# Patient Record
Sex: FEMALE | Race: WHITE | NOT HISPANIC OR LATINO | Employment: FULL TIME | URBAN - METROPOLITAN AREA
[De-identification: names, ages, dates, MRNs, and addresses within clinical notes are randomized per-mention and may not be internally consistent; named-entity substitution may affect disease eponyms.]

---

## 2020-12-31 ENCOUNTER — HOSPITAL ENCOUNTER (OUTPATIENT)
Dept: URGENT CARE | Facility: CLINIC | Age: 20
Discharge: HOME | End: 2020-12-31
Attending: NURSE PRACTITIONER

## 2021-01-08 LAB
C TRACH RRNA CVX QL NAA+PROBE: NOT DETECTED
N GONORRHOEA DNA SPEC QL NAA+PROBE: NOT DETECTED

## 2021-02-04 ENCOUNTER — HOSPITAL ENCOUNTER (OUTPATIENT)
Dept: URGENT CARE | Facility: CLINIC | Age: 21
Discharge: HOME | End: 2021-02-04
Attending: FAMILY MEDICINE

## 2023-10-03 ENCOUNTER — OFFICE VISIT (OUTPATIENT)
Dept: INTERNAL MEDICINE | Facility: CLINIC | Age: 23
End: 2023-10-03
Payer: COMMERCIAL

## 2023-10-03 VITALS
HEIGHT: 67 IN | BODY MASS INDEX: 25.77 KG/M2 | TEMPERATURE: 97.6 F | RESPIRATION RATE: 18 BRPM | WEIGHT: 164.2 LBS | SYSTOLIC BLOOD PRESSURE: 136 MMHG | DIASTOLIC BLOOD PRESSURE: 80 MMHG | OXYGEN SATURATION: 99 % | HEART RATE: 113 BPM

## 2023-10-03 DIAGNOSIS — Z13.220 SCREENING FOR LIPID DISORDERS: ICD-10-CM

## 2023-10-03 DIAGNOSIS — E53.8 VITAMIN B12 DEFICIENCY: ICD-10-CM

## 2023-10-03 DIAGNOSIS — F41.9 ANXIETY AND DEPRESSION: ICD-10-CM

## 2023-10-03 DIAGNOSIS — Z01.89 ROUTINE LAB DRAW: ICD-10-CM

## 2023-10-03 DIAGNOSIS — N94.6 DYSMENORRHEA: ICD-10-CM

## 2023-10-03 DIAGNOSIS — R53.83 OTHER FATIGUE: ICD-10-CM

## 2023-10-03 DIAGNOSIS — E55.9 VITAMIN D DEFICIENCY: ICD-10-CM

## 2023-10-03 DIAGNOSIS — D50.9 IRON DEFICIENCY ANEMIA, UNSPECIFIED IRON DEFICIENCY ANEMIA TYPE: ICD-10-CM

## 2023-10-03 DIAGNOSIS — G43.101 MIGRAINE WITH AURA AND WITH STATUS MIGRAINOSUS, NOT INTRACTABLE: ICD-10-CM

## 2023-10-03 DIAGNOSIS — F32.A ANXIETY AND DEPRESSION: ICD-10-CM

## 2023-10-03 DIAGNOSIS — Z76.89 ENCOUNTER TO ESTABLISH CARE: Primary | ICD-10-CM

## 2023-10-03 RX ORDER — RIMEGEPANT SULFATE 75 MG/75MG
75 TABLET, ORALLY DISINTEGRATING ORAL DAILY PRN
Qty: 4 TABLET | Refills: 0 | COMMUNITY
Start: 2023-10-03

## 2023-10-03 RX ORDER — MAGNESIUM OXIDE 400 MG/1
400 TABLET ORAL DAILY
COMMUNITY

## 2023-10-03 RX ORDER — BUSPIRONE HYDROCHLORIDE 5 MG/1
5 TABLET ORAL 3 TIMES DAILY PRN
Qty: 90 TABLET | Refills: 0 | Status: SHIPPED | OUTPATIENT
Start: 2023-10-03

## 2023-10-03 RX ORDER — FERROUS SULFATE TAB EC 324 MG (65 MG FE EQUIVALENT) 324 (65 FE) MG
324 TABLET DELAYED RESPONSE ORAL
COMMUNITY

## 2023-10-03 RX ORDER — ZINC SULFATE 50(220)MG
220 CAPSULE ORAL DAILY
COMMUNITY

## 2023-10-03 RX ORDER — LANOLIN ALCOHOL/MO/W.PET/CERES
1000 CREAM (GRAM) TOPICAL DAILY
COMMUNITY

## 2023-10-03 RX ORDER — ESCITALOPRAM OXALATE 20 MG/1
20 TABLET ORAL DAILY
COMMUNITY

## 2023-10-03 NOTE — PROGRESS NOTES
Chief Complaint  Establish Care (23 year old female here today to establish care. She complains of fatigue, weight gain, painful periods and headaches. States she would to discuss PCOS)    History of Present Illness  SUBJECTIVE  Marisol Moses presents to [facility] to establish care.    Previous PCP-Deanna Shine.     Tobacco-denies use.  Alcohol-occasionally.  PAP smear-never. Counseling provided    Patient states that she feels like she may have had some vitamin deficiencies so she started taking vitamins.     Patient states that she has gained weight even after exercising and eating healthier. She states that she is fatigued and has painful periods.  She states has a menstrual cycle every 28-30 days, 3-5 days. She states she is having to change out the heaviest level tampon every 2 hours on her heaviest days.   Plans to try to conceive in the next couple of years.    Headaches-worse over the last 2 weeks. She states that her vision changes and then within 30 minutes she is down. She denies a history of migraines. She states that she was getting migraines once every couple of months and lately, they have been occurring more frequently, sometimes lasting a few days.    She denies any chest pain, soa, palpitations, nausea, vomiting, diarrhea, changes to bowel/bladder habits.    Past Medical History:   Diagnosis Date    Anxiety     Depression     Headache       Family History   Problem Relation Age of Onset    Depression Mother     Anxiety disorder Mother     Hypothyroidism Mother     Hypertension Father     Atrial fibrillation Father     Paternal grandmother-breast cancer (diagnosed around age 63)        History reviewed. No pertinent surgical history.     Current Outpatient Medications:     escitalopram (LEXAPRO) 20 MG tablet, Take 1 tablet by mouth Daily., Disp: , Rfl:     ferrous sulfate 324 (65 Fe) MG tablet delayed-release EC tablet, Take 1 tablet by mouth  "Daily With Breakfast., Disp: , Rfl:     magnesium oxide (MAG-OX) 400 MG tablet, Take 1 tablet by mouth Daily., Disp: , Rfl:     Omega-3 Fatty Acids (OMEGA 3 500 PO), Take  by mouth., Disp: , Rfl:     vitamin B-12 (CYANOCOBALAMIN) 1000 MCG tablet, Take 1 tablet by mouth Daily., Disp: , Rfl:     zinc sulfate (ZINCATE) 220 (50 Zn) MG capsule, Take 1 capsule by mouth Daily., Disp: , Rfl:     busPIRone (BUSPAR) 5 MG tablet, Take 1 tablet by mouth 3 (Three) Times a Day As Needed (anxiety)., Disp: 90 tablet, Rfl: 0    Rimegepant Sulfate (Nurtec) 75 MG tablet dispersible tablet, Take 1 tablet by mouth Daily As Needed (migraines)., Disp: 4 tablet, Rfl: 0    OBJECTIVE  Vital Signs:   /80 (BP Location: Left arm, Patient Position: Sitting)   Pulse 113   Temp 97.6 °F (36.4 °C) (Temporal)   Resp 18   Ht 170.2 cm (67\")   Wt 74.5 kg (164 lb 3.2 oz)   SpO2 99%   BMI 25.72 kg/m²    Estimated body mass index is 25.72 kg/m² as calculated from the following:    Height as of this encounter: 170.2 cm (67\").    Weight as of this encounter: 74.5 kg (164 lb 3.2 oz).     Wt Readings from Last 3 Encounters:   10/03/23 74.5 kg (164 lb 3.2 oz)     BP Readings from Last 3 Encounters:   10/03/23 136/80       Physical Exam  Vitals and nursing note reviewed.   Constitutional:       Appearance: Normal appearance.   HENT:      Head: Normocephalic.      Right Ear: Tympanic membrane normal.      Left Ear: Tympanic membrane normal.   Eyes:      Extraocular Movements: Extraocular movements intact.      Conjunctiva/sclera: Conjunctivae normal.   Cardiovascular:      Rate and Rhythm: Normal rate and regular rhythm.      Heart sounds: Normal heart sounds. No murmur heard.  Pulmonary:      Effort: Pulmonary effort is normal.      Breath sounds: Normal breath sounds. No wheezing or rales.   Abdominal:      General: Bowel sounds are normal.      Palpations: Abdomen is soft.      Tenderness: There is no abdominal tenderness. There is no guarding. "   Musculoskeletal:         General: No swelling. Normal range of motion.      Cervical back: Normal range of motion and neck supple.   Skin:     General: Skin is warm and dry.   Neurological:      General: No focal deficit present.      Mental Status: She is alert and oriented to person, place, and time. Mental status is at baseline.   Psychiatric:         Mood and Affect: Mood normal.         Behavior: Behavior normal.         Thought Content: Thought content normal.         Judgment: Judgment normal.        Result Review        No Images in the past 120 days found..     The above data has been reviewed by MICHAEL Barr 10/03/2023 13:56 EDT.          Patient Care Team:  Adelina Avery APRN as PCP - General (Internal Medicine)    BMI is >= 25 and <30. (Overweight) The following options were offered after discussion;: exercise counseling/recommendations and nutrition counseling/recommendations       ASSESSMENT & PLAN    Diagnoses and all orders for this visit:    1. Encounter to establish care (Primary)    2. Migraine with aura and with status migrainosus, not intractable  Assessment & Plan:  Complains of worsening migraines.  She states she used to get a migraine once every month or two but over the last few weeks she has had several.  She states that sometimes they last a few days.  Will do a trial of Nurtec.  Samples provided in the office.    Orders:  -     MRI Brain Without Contrast; Future  -     Rimegepant Sulfate (Nurtec) 75 MG tablet dispersible tablet; Take 1 tablet by mouth Daily As Needed (migraines).  Dispense: 4 tablet; Refill: 0    3. Anxiety and depression  Assessment & Plan:  Patient states that her anxiety and depression is stable.  She states she is an anxious person.  She states that Lexapro works better than the others she has been on.  Denies any SI/HI.  We discussed PGX testing.  We will go ahead and do that.  Start Buspar PRN as well.   Risks/benefits discussed with patient.     Orders:  -      busPIRone (BUSPAR) 5 MG tablet; Take 1 tablet by mouth 3 (Three) Times a Day As Needed (anxiety).  Dispense: 90 tablet; Refill: 0    4. Dysmenorrhea  -     Insulin, Random; Future  -     Ambulatory Referral to Obstetrics / Gynecology    5. Routine lab draw  -     CBC w AUTO Differential; Future  -     Comprehensive metabolic panel; Future  -     TSH+Free T4; Future    6. Screening for lipid disorders  -     Lipid panel; Future    7. Vitamin B12 deficiency  -     Vitamin B12 & Folate; Future    8. Vitamin D deficiency  -     Vitamin D,25-Hydroxy; Future    9. Iron deficiency anemia, unspecified iron deficiency anemia type  -     Iron Profile; Future  -     Ferritin; Future    10. Other fatigue  -     Magnesium; Future  -     PTH, Intact; Future         Tobacco Use: Low Risk     Smoking Tobacco Use: Never    Smokeless Tobacco Use: Never    Passive Exposure: Not on file       Follow Up     Return in about 4 weeks (around 10/31/2023) for Annual physical.    Please note that portions of this note were completed with a voice recognition program.    Patient was given instructions and counseling regarding her condition or for health maintenance advice. Please see specific information pulled into the AVS if appropriate.   I have reviewed information obtained and documented by others and I have confirmed the accuracy of this documented note.    MICHAEL Barr

## 2023-10-03 NOTE — ASSESSMENT & PLAN NOTE
Complains of worsening migraines.  She states she used to get a migraine once every month or two but over the last few weeks she has had several.  She states that sometimes they last a few days.  Will do a trial of Nurtec.  Samples provided in the office.

## 2023-10-03 NOTE — ASSESSMENT & PLAN NOTE
Patient states that her anxiety and depression is stable.  She states she is an anxious person.  She states that Lexapro works better than the others she has been on.  Denies any SI/HI.  We discussed PGX testing.  We will go ahead and do that.  Start Buspar PRN as well.   Risks/benefits discussed with patient.

## 2023-10-26 ENCOUNTER — TELEPHONE (OUTPATIENT)
Dept: INTERNAL MEDICINE | Facility: CLINIC | Age: 23
End: 2023-10-26
Payer: COMMERCIAL

## 2023-11-02 ENCOUNTER — INITIAL PRENATAL (OUTPATIENT)
Dept: OBSTETRICS AND GYNECOLOGY | Facility: CLINIC | Age: 23
End: 2023-11-02
Payer: COMMERCIAL

## 2023-11-02 ENCOUNTER — TELEPHONE (OUTPATIENT)
Dept: OBSTETRICS AND GYNECOLOGY | Facility: CLINIC | Age: 23
End: 2023-11-02

## 2023-11-02 VITALS — BODY MASS INDEX: 25.37 KG/M2 | WEIGHT: 162 LBS | DIASTOLIC BLOOD PRESSURE: 77 MMHG | SYSTOLIC BLOOD PRESSURE: 116 MMHG

## 2023-11-02 DIAGNOSIS — Z12.4 SCREENING FOR CERVICAL CANCER: ICD-10-CM

## 2023-11-02 DIAGNOSIS — O21.9 NAUSEA AND VOMITING IN PREGNANCY: ICD-10-CM

## 2023-11-02 DIAGNOSIS — Z34.01 ENCOUNTER FOR SUPERVISION OF NORMAL FIRST PREGNANCY IN FIRST TRIMESTER: Primary | ICD-10-CM

## 2023-11-02 DIAGNOSIS — O99.341 DEPRESSION DURING PREGNANCY IN FIRST TRIMESTER: ICD-10-CM

## 2023-11-02 DIAGNOSIS — F32.A DEPRESSION DURING PREGNANCY IN FIRST TRIMESTER: ICD-10-CM

## 2023-11-02 DIAGNOSIS — O26.851 SPOTTING DURING PREGNANCY IN FIRST TRIMESTER: ICD-10-CM

## 2023-11-02 DIAGNOSIS — Z11.3 SCREEN FOR STD (SEXUALLY TRANSMITTED DISEASE): ICD-10-CM

## 2023-11-02 LAB
B-HCG UR QL: POSITIVE
EXPIRATION DATE: ABNORMAL
GLUCOSE UR STRIP-MCNC: NEGATIVE MG/DL
INTERNAL NEGATIVE CONTROL: NEGATIVE
INTERNAL POSITIVE CONTROL: POSITIVE
Lab: ABNORMAL
PROT UR STRIP-MCNC: NEGATIVE MG/DL

## 2023-11-02 RX ORDER — ONDANSETRON 4 MG/1
4 TABLET, ORALLY DISINTEGRATING ORAL EVERY 8 HOURS PRN
Qty: 30 TABLET | Refills: 2 | Status: SHIPPED | OUTPATIENT
Start: 2023-11-02

## 2023-11-02 NOTE — PROGRESS NOTES
Initial ob visit     CC- Here for care of pregnancy     Marisol Moses is being seen today for her first obstetrical visit.  She is a 23 y.o.    6w4d gestation.     # 1 - Date: None, Sex: None, Weight: None, GA: None, Delivery: None, Apgar1: None, Apgar5: None, Living: None, Birth Comments: None      Current obstetric complaints : light bleeding today when wipinge  Duration/severity of complaints:   Initial positive test date : 10/15/2023     Location : @ home    Prior obstetric issues, potential pregnancy concerns: Migraines  Family history of genetic issues (includes FOB): none  Prior infections concerning in pregnancy (Rash, fever in last 2 weeks): none  Varicella Hx -immunity  Prior testing for Cystic Fibrosis Carrier or Sickle Cell Trait- unknown status  Prepregnancy BMI - Body mass index is 25.37 kg/m².  Hx of HSV for patient or partner : No      Past Medical History:   Diagnosis Date    Anxiety     Depression     Headache     Migraine        History reviewed. No pertinent surgical history.      Current Outpatient Medications:     ondansetron ODT (ZOFRAN-ODT) 4 MG disintegrating tablet, Place 1 tablet on the tongue Every 8 (Eight) Hours As Needed for Nausea or Vomiting., Disp: 30 tablet, Rfl: 2    Allergies   Allergen Reactions    Penicillins Rash       Social History     Socioeconomic History    Marital status: Single   Tobacco Use    Smoking status: Never    Smokeless tobacco: Never   Vaping Use    Vaping Use: Never used   Substance and Sexual Activity    Alcohol use: Not Currently     Comment: socially    Drug use: Never    Sexual activity: Yes     Partners: Male     Birth control/protection: None       Family History   Problem Relation Age of Onset    Hypertension Father     Atrial fibrillation Father     Depression Mother     Anxiety disorder Mother     Hypothyroidism Mother     Breast cancer Paternal Grandmother     Ovarian cancer Neg Hx     Uterine cancer Neg Hx     Colon cancer Neg Hx      Deep vein thrombosis Neg Hx     Pulmonary embolism Neg Hx        Review of systems     Review of Systems   Constitutional:  Negative for fever.   Eyes:  Negative for visual disturbance.   Gastrointestinal:  Positive for nausea. Negative for abdominal pain and vomiting.   Genitourinary:  Positive for breast pain and vaginal bleeding. Negative for pelvic pain and vaginal discharge.   Neurological:  Negative for headache.   All other systems reviewed and are negative.          Objective    /77   Wt 73.5 kg (162 lb)   LMP 09/17/2023 (Approximate)   BMI 25.37 kg/m²     Prenatal Assessment  Fundal Height (cm): 6 cm  Movement: Absent  Dilation/Effacement/Station  Dilation: Closed  Effacement (%): 0  Station: -2  Edema  LLE Edema: None  RLE Edema: None  Facial Edema: None    Physical Exam  Constitutional:       General: She is not in acute distress.     Appearance: Normal appearance. She is normal weight.   Genitourinary:      Right Labia: No lesions or Bartholin's cyst.     Left Labia: No lesions or Bartholin's cyst.     No inguinal adenopathy present in the right or left side.     No vaginal discharge or bleeding.        Right Adnexa: not tender, not full and no mass present.     Left Adnexa: not tender, not full and no mass present.     No cervical motion tenderness or lesion.      Uterus is enlarged.      Uterus is not tender.      No uterine mass detected.     Uterus is retroverted.   Breasts:     Right: No inverted nipple, mass or nipple discharge.      Left: No inverted nipple, mass or nipple discharge.   HENT:      Head: Normocephalic and atraumatic.      Mouth/Throat:      Mouth: Mucous membranes are moist.   Eyes:      Conjunctiva/sclera: Conjunctivae normal.   Cardiovascular:      Rate and Rhythm: Normal rate and regular rhythm.      Pulses: Normal pulses.      Heart sounds: No murmur heard.  Pulmonary:      Effort: Pulmonary effort is normal. No respiratory distress.      Breath sounds: Normal breath  sounds.   Abdominal:      General: There is no distension.      Palpations: Abdomen is soft.      Tenderness: There is no abdominal tenderness.   Musculoskeletal:         General: No tenderness.      Cervical back: Normal range of motion and neck supple. No tenderness.      Right lower leg: No edema.      Left lower leg: No edema.   Lymphadenopathy:      Cervical: No cervical adenopathy.      Lower Body: No right inguinal adenopathy. No left inguinal adenopathy.   Neurological:      General: No focal deficit present.      Mental Status: She is alert.   Skin:     General: Skin is warm and dry.      Capillary Refill: Capillary refill takes less than 2 seconds.   Psychiatric:         Thought Content: Thought content normal.   Vitals reviewed. Exam conducted with a chaperone present.          Brief Urine Lab Results  (Last result in the past 365 days)        Color   Clarity   Blood   Leuk Est   Nitrite   Protein   CREAT   Urine HCG        11/02/23 1306               Positive       11/02/23 1306           Negative                    Assessment & Plan     Diagnoses and all orders for this visit:    1. Encounter for supervision of normal first pregnancy in first trimester (Primary)  -     POC Urinalysis Dipstick  -     POC Pregnancy, Urine  -     OB Panel With HIV  -     Urine Culture - , Urine, Clean Catch    2. Spotting during pregnancy in first trimester  -     US Ob Transvaginal    3. Depression during pregnancy in first trimester    4. Nausea and vomiting in pregnancy    5. Screen for STD (sexually transmitted disease)  -     Chlamydia trachomatis, Neisseria gonorrhoeae, Trichomonas vaginalis, PCR - Swab, Vagina    6. Screening for cervical cancer  -     IGP, Rfx Aptima HPV ASCU    Other orders  -     ondansetron ODT (ZOFRAN-ODT) 4 MG disintegrating tablet; Place 1 tablet on the tongue Every 8 (Eight) Hours As Needed for Nausea or Vomiting.  Dispense: 30 tablet; Refill: 2        1) Pregnancy at 6w4d  Initial  pregnancy, initial visit.   Discussed screening varicella status and carrier status for CF, SMA   Declined as she is nervous about blood draws to begin with and requests to limit to what is required/necessary (can revisit down the road)   2) Spotting   Started today   Exam with no blood in vault and not active bleeding from cervix, cervix closed  Checking ultrasound to confirm location/viability   3) Depression/anxiety in pregnancy   Was on Lexapro and Buspar  Discussed use of medication in pregnancy   Currently wishes to try without medication   4) Nausea  Asked for non-sedating   Zofran sent.            Activity recommendation : 150 minutes/week of moderate intensity aerobic activity unless we limit for bleeding, hypertension or other pregnancy complication   Patient is on Prenatal vitamins  Problem list reviewed and updated.  Reviewed routine prenatal care with the office to include but not limited to   General pregnancy recommendations reviewed including but not limited to not changing cat litter, food restrictions, avoidance of alcohol, tobacco and drugs and saunas/hot tubs.   All questions answered.     Malvin Hartley MD   11/2/2023  13:49 EDT

## 2023-11-02 NOTE — TELEPHONE ENCOUNTER
Avis,     Ultrasound was reassuring   Leonardo pregnancy with heart beat in the uterus.      She was measuring 7 weeks and 1 day, which is close enough to her cycle of 6 weeks and 4 days to keep our EDC we discussed.     Due date 6/23/24.     Please make sure they are aware.     Thanks,   Dr. Hartley

## 2023-11-03 LAB
ABO GROUP BLD: NORMAL
BASOPHILS # BLD AUTO: 0 X10E3/UL (ref 0–0.2)
BASOPHILS NFR BLD AUTO: 0 %
BLD GP AB SCN SERPL QL: NEGATIVE
EOSINOPHIL # BLD AUTO: 0.1 X10E3/UL (ref 0–0.4)
EOSINOPHIL NFR BLD AUTO: 1 %
ERYTHROCYTE [DISTWIDTH] IN BLOOD BY AUTOMATED COUNT: 13 % (ref 11.7–15.4)
HBV SURFACE AG SERPL QL IA: NEGATIVE
HCT VFR BLD AUTO: 37.2 % (ref 34–46.6)
HCV IGG SERPL QL IA: NON REACTIVE
HGB BLD-MCNC: 12.5 G/DL (ref 11.1–15.9)
HIV 1+2 AB+HIV1 P24 AG SERPL QL IA: NON REACTIVE
IMM GRANULOCYTES # BLD AUTO: 0 X10E3/UL (ref 0–0.1)
IMM GRANULOCYTES NFR BLD AUTO: 0 %
LYMPHOCYTES # BLD AUTO: 2 X10E3/UL (ref 0.7–3.1)
LYMPHOCYTES NFR BLD AUTO: 22 %
MCH RBC QN AUTO: 30 PG (ref 26.6–33)
MCHC RBC AUTO-ENTMCNC: 33.6 G/DL (ref 31.5–35.7)
MCV RBC AUTO: 89 FL (ref 79–97)
MONOCYTES # BLD AUTO: 0.8 X10E3/UL (ref 0.1–0.9)
MONOCYTES NFR BLD AUTO: 8 %
NEUTROPHILS # BLD AUTO: 6.4 X10E3/UL (ref 1.4–7)
NEUTROPHILS NFR BLD AUTO: 69 %
PLATELET # BLD AUTO: 349 X10E3/UL (ref 150–450)
RBC # BLD AUTO: 4.17 X10E6/UL (ref 3.77–5.28)
RH BLD: NEGATIVE
RPR SER QL: NON REACTIVE
RUBV IGG SERPL IA-ACNC: 1.76 INDEX
WBC # BLD AUTO: 9.4 X10E3/UL (ref 3.4–10.8)

## 2023-11-03 NOTE — PROGRESS NOTES
Avis, Her prenatal panel is normal. She is A negative for her blood type, should do Rhogam for the spotting/bleeding she was doing yesterday. So will review these initial tests when seen for her next visit in person. Thanks, Dr. Hartley

## 2023-11-04 LAB
BACTERIA UR CULT: NORMAL
BACTERIA UR CULT: NORMAL

## 2023-11-06 ENCOUNTER — CLINICAL SUPPORT (OUTPATIENT)
Dept: OBSTETRICS AND GYNECOLOGY | Facility: CLINIC | Age: 23
End: 2023-11-06
Payer: COMMERCIAL

## 2023-11-07 LAB
C TRACH RRNA SPEC QL NAA+PROBE: NEGATIVE
CONV .: NORMAL
CYTOLOGIST CVX/VAG CYTO: NORMAL
CYTOLOGY CVX/VAG DOC CYTO: NORMAL
DX ICD CODE: NORMAL
HIV 1 & 2 AB SER-IMP: NORMAL
N GONORRHOEA RRNA SPEC QL NAA+PROBE: NEGATIVE
OTHER STN SPEC: NORMAL
STAT OF ADQ CVX/VAG CYTO-IMP: NORMAL
T VAGINALIS RRNA SPEC QL NAA+PROBE: NEGATIVE

## 2023-11-08 ENCOUNTER — TELEPHONE (OUTPATIENT)
Dept: OBSTETRICS AND GYNECOLOGY | Facility: CLINIC | Age: 23
End: 2023-11-08
Payer: COMMERCIAL

## 2023-11-08 NOTE — TELEPHONE ENCOUNTER
----- Message from Avis Cerda MA sent at 11/3/2023  3:18 PM EDT -----  L/m for pt/fortunato  ----- Message -----  From: Malvin Hartley MD  Sent: 11/3/2023   1:41 PM EDT  To: WELLINGTON Hilton,     Up to her.   Generally recommended (could do closer to home - Hospital or possibly even pharmacy if this will work better)    Thanks,   Dr. Hartley    ----- Message -----  From: Avis Cerda MA  Sent: 11/3/2023  12:02 PM EDT  To: MD Dr. Odilia Marley,    She is aware. She is asking if Rhogam is necessary as she did not have much bleeding. It will be difficult for her to come to the office for the injection due to work and lives further away.     Thanks,  Avis   ----- Message -----  From: Malvin Hartley MD  Sent: 11/3/2023   9:12 AM EDT  To: WELLINGTON Hilton, Her prenatal panel is normal. She is A negative for her blood type, should do Rhogam for the spotting/bleeding she was doing yesterday. So will review these initial tests when seen for her next visit in person. Thanks, Dr. Hartley

## 2023-11-08 NOTE — TELEPHONE ENCOUNTER
----- Message from Malvin Hartley MD sent at 11/6/2023  8:59 AM EST -----  Avis, urine culture did not show UTI. Please let her know. Thanks Dr. Hartley

## 2023-11-14 ENCOUNTER — TELEPHONE (OUTPATIENT)
Dept: OBSTETRICS AND GYNECOLOGY | Facility: CLINIC | Age: 23
End: 2023-11-14
Payer: COMMERCIAL

## 2023-11-14 NOTE — TELEPHONE ENCOUNTER
Caller: Marisol Moses    Relationship: Self    Best call back number: [phone]    Requested Prescriptions: ZOFRAN  Requested Prescriptions      No prescriptions requested or ordered in this encounter        Pharmacy where request should be sent:      Last office visit with prescribing clinician: Visit date not found   Last telemedicine visit with prescribing clinician: Visit date not found   Next office visit with prescribing clinician: 11/27/2023     Additional details provided by patient: [facility] [place]    Does the patient have less than a 3 day supply:  [x] Yes  [] No    Would you like a call back once the refill request has been completed: [x] Yes [] No    If the office needs to give you a call back, can they leave a voicemail: [x] Yes [] No    Chantelle Douglas Rep   11/14/23 16:38 EST     DR. TAPIA PRESCRIBED THIS MEDICATION TO THE PT TO TAKE ONCE EVERY 8 HRS FOR NAUSEA - QT OF 30 W/2 REFILLS - PT IS TAKING THIS TWICE A DAY AND WILL RUN OUT OF THE ORIGINAL 30 TOMORROW - PT WILL ASK THE PHARMACY TO REFILL BUT THEY MAY NOT BASED ON THE QUANTITY - DOES THIS NEED TO BE RESENT WITH A DIFFERENT QUANTITY SO PT CAN GET REFILL

## 2023-11-27 ENCOUNTER — ROUTINE PRENATAL (OUTPATIENT)
Dept: OBSTETRICS AND GYNECOLOGY | Facility: CLINIC | Age: 23
End: 2023-11-27
Payer: COMMERCIAL

## 2023-11-27 VITALS — BODY MASS INDEX: 25.22 KG/M2 | WEIGHT: 161 LBS | DIASTOLIC BLOOD PRESSURE: 74 MMHG | SYSTOLIC BLOOD PRESSURE: 130 MMHG

## 2023-11-27 DIAGNOSIS — F32.A DEPRESSION DURING PREGNANCY IN FIRST TRIMESTER: ICD-10-CM

## 2023-11-27 DIAGNOSIS — O99.341 DEPRESSION DURING PREGNANCY IN FIRST TRIMESTER: ICD-10-CM

## 2023-11-27 DIAGNOSIS — O99.611 CONSTIPATION IN PREGNANCY IN FIRST TRIMESTER: ICD-10-CM

## 2023-11-27 DIAGNOSIS — K59.00 CONSTIPATION IN PREGNANCY IN FIRST TRIMESTER: ICD-10-CM

## 2023-11-27 DIAGNOSIS — Z34.01 ENCOUNTER FOR SUPERVISION OF NORMAL FIRST PREGNANCY IN FIRST TRIMESTER: Primary | ICD-10-CM

## 2023-11-27 LAB
GLUCOSE UR STRIP-MCNC: NEGATIVE MG/DL
PROT UR STRIP-MCNC: NEGATIVE MG/DL

## 2023-11-27 PROCEDURE — 0502F SUBSEQUENT PRENATAL CARE: CPT | Performed by: OBSTETRICS & GYNECOLOGY

## 2023-11-27 NOTE — PROGRESS NOTES
OB follow up     Marisol Moses is a 23 y.o.  10w1d being seen today for her obstetrical visit.  Patient reports  constipation . Fetal movement:  absent .    Her prenatal care is complicated by (and status): depression/anxiety    Review of Systems  Cramping/contractions : none   Vaginal bleeding: none   Fetal movement too early     /74   Wt 73 kg (161 lb)   LMP 2023 (Approximate)   BMI 25.22 kg/m²     Prenatal Assessment  Fetal Heart Rate: 166  Fundal Height (cm): 10 cm  Movement: Absent  Edema  LLE Edema: None  RLE Edema: None  Facial Edema: None        Assessment    Diagnoses and all orders for this visit:    1. Encounter for supervision of normal first pregnancy in first trimester (Primary)  -     POC Urinalysis Dipstick    2. Depression during pregnancy in first trimester    3. Constipation in pregnancy in first trimester        1) pregnancy at 10w1d   Prenatal panel normal   STD and urine culture negative   Pap normal   NIPT discussed and declined   2) Anxiety/depression   Stopped Buspar and lexapro   Doing okay for now  3) Constipation   Encouraged hydration/mirilax  Considering from there.       Plan    Reviewed this stage of pregnancy  Problem list updated   Follow up in 4-5 weeks    Malvin Hartley MD   2023  13:31 EST

## 2023-12-20 ENCOUNTER — ROUTINE PRENATAL (OUTPATIENT)
Dept: OBSTETRICS AND GYNECOLOGY | Facility: CLINIC | Age: 23
End: 2023-12-20
Payer: COMMERCIAL

## 2023-12-20 VITALS — SYSTOLIC BLOOD PRESSURE: 127 MMHG | BODY MASS INDEX: 25.53 KG/M2 | WEIGHT: 163 LBS | DIASTOLIC BLOOD PRESSURE: 68 MMHG

## 2023-12-20 DIAGNOSIS — F32.A DEPRESSION DURING PREGNANCY IN FIRST TRIMESTER: ICD-10-CM

## 2023-12-20 DIAGNOSIS — Z34.01 ENCOUNTER FOR SUPERVISION OF NORMAL FIRST PREGNANCY IN FIRST TRIMESTER: Primary | ICD-10-CM

## 2023-12-20 DIAGNOSIS — O99.341 DEPRESSION DURING PREGNANCY IN FIRST TRIMESTER: ICD-10-CM

## 2023-12-20 LAB
GLUCOSE UR STRIP-MCNC: NEGATIVE MG/DL
PROT UR STRIP-MCNC: NEGATIVE MG/DL

## 2023-12-20 NOTE — PROGRESS NOTES
OB follow up     Marisol Moses is a 23 y.o.  13w3d being seen today for her obstetrical visit.  Patient reports nausea. Fetal movement:  absent .    Her prenatal care is complicated by (and status): anxiety/depression     Review of Systems  Cramping/contractions : none   Vaginal bleeding: none   Fetal movement too early     /68   Wt 73.9 kg (163 lb)   LMP 2023 (Approximate)   BMI 25.53 kg/m²     Prenatal Assessment  Fetal Heart Rate: 163  Fundal Height (cm): 13 cm  Movement: Absent  Edema  LLE Edema: None  RLE Edema: None  Facial Edema: None        Assessment    Diagnoses and all orders for this visit:    1. Encounter for supervision of normal first pregnancy in first trimester (Primary)  -     POC Urinalysis Dipstick    2. Depression during pregnancy in first trimester        1) pregnancy at 13w3d   Quickening reviewed   Discussed NIPT, screening was declined  Flu shot recommended   Anatomy scan for next visit at 20-21 wks   Transitional to second trimester   2) Anxiety/depression   Doing well off medication     Plan    Reviewed this stage of pregnancy  Problem list updated   Follow up in 4 weeks    Malvin Hartley MD   2023  14:26 EST

## 2024-01-23 ENCOUNTER — ROUTINE PRENATAL (OUTPATIENT)
Dept: OBSTETRICS AND GYNECOLOGY | Facility: CLINIC | Age: 24
End: 2024-01-23
Payer: COMMERCIAL

## 2024-01-23 VITALS — BODY MASS INDEX: 25.84 KG/M2 | DIASTOLIC BLOOD PRESSURE: 73 MMHG | WEIGHT: 165 LBS | SYSTOLIC BLOOD PRESSURE: 113 MMHG

## 2024-01-23 DIAGNOSIS — Z36.89 ENCOUNTER FOR FETAL ANATOMIC SURVEY: ICD-10-CM

## 2024-01-23 DIAGNOSIS — Z34.02 ENCOUNTER FOR SUPERVISION OF NORMAL FIRST PREGNANCY IN SECOND TRIMESTER: Primary | ICD-10-CM

## 2024-01-23 LAB
GLUCOSE UR STRIP-MCNC: NEGATIVE MG/DL
PROT UR STRIP-MCNC: NEGATIVE MG/DL

## 2024-01-23 PROCEDURE — 0502F SUBSEQUENT PRENATAL CARE: CPT | Performed by: OBSTETRICS & GYNECOLOGY

## 2024-01-23 NOTE — PROGRESS NOTES
OB follow up     Marisol Moses is a 23 y.o.  18w2d being seen today for her obstetrical visit.  Patient reports  headaches and dizziness . Fetal movement:  present .    Her prenatal care is complicated by (and status): anxiety/depression     Review of Systems  Cramping/contractions : none   Vaginal bleeding: none   Fetal movement present     /73   Wt 74.8 kg (165 lb)   LMP 2023 (Approximate)   BMI 25.84 kg/m²     Prenatal Assessment  Fetal Heart Rate: 154  Fundal Height (cm): 18 cm  Movement: Present  Edema  LLE Edema: None  RLE Edema: None  Facial Edema: None        Assessment    Diagnoses and all orders for this visit:    1. Encounter for supervision of normal first pregnancy in second trimester (Primary)  -     POC Urinalysis Dipstick    2. Encounter for fetal anatomic survey  -     US Ob 14 + Weeks Single or First Gestation; Future        1) pregnancy at 18w2d   Reviewed headaches (to try Mg++ supplement), dizziness (anti-histamine) and heartburn (using TUMS)   Check anatomy with next visit.    -anxiety/depression doing fine off medication     Plan    Reviewed this stage of pregnancy  Problem list updated   Follow up in 2 weeks with anatomy scan     Malvin Hartley MD   2024  15:23 EST

## 2024-02-06 ENCOUNTER — ROUTINE PRENATAL (OUTPATIENT)
Dept: OBSTETRICS AND GYNECOLOGY | Facility: CLINIC | Age: 24
End: 2024-02-06
Payer: COMMERCIAL

## 2024-02-06 VITALS — DIASTOLIC BLOOD PRESSURE: 60 MMHG | BODY MASS INDEX: 26.63 KG/M2 | SYSTOLIC BLOOD PRESSURE: 117 MMHG | WEIGHT: 170 LBS

## 2024-02-06 DIAGNOSIS — Z34.02 ENCOUNTER FOR SUPERVISION OF NORMAL FIRST PREGNANCY IN SECOND TRIMESTER: Primary | ICD-10-CM

## 2024-02-06 DIAGNOSIS — O99.341 DEPRESSION DURING PREGNANCY IN FIRST TRIMESTER: ICD-10-CM

## 2024-02-06 DIAGNOSIS — F32.A DEPRESSION DURING PREGNANCY IN FIRST TRIMESTER: ICD-10-CM

## 2024-02-06 DIAGNOSIS — Z36.2 ENCOUNTER FOR OTHER ANTENATAL SCREENING FOLLOW-UP: ICD-10-CM

## 2024-02-06 DIAGNOSIS — O26.892 PREGNANCY HEADACHE IN SECOND TRIMESTER: ICD-10-CM

## 2024-02-06 DIAGNOSIS — R51.9 PREGNANCY HEADACHE IN SECOND TRIMESTER: ICD-10-CM

## 2024-02-06 LAB
GLUCOSE UR STRIP-MCNC: NEGATIVE MG/DL
PROT UR STRIP-MCNC: ABNORMAL MG/DL

## 2024-02-06 RX ORDER — MAGNESIUM OXIDE 400 MG/1
400 TABLET ORAL DAILY
Qty: 30 TABLET | Refills: 3 | Status: SHIPPED | OUTPATIENT
Start: 2024-02-06

## 2024-02-06 RX ORDER — PROMETHAZINE HYDROCHLORIDE 25 MG/1
25 TABLET ORAL EVERY 6 HOURS PRN
Qty: 30 TABLET | Refills: 2 | Status: SHIPPED | OUTPATIENT
Start: 2024-02-06

## 2024-02-06 NOTE — PROGRESS NOTES
OB follow up     Marisol Moses is a 23 y.o.  20w2d being seen today for her obstetrical visit.  Patient reports no complaints. Fetal movement: normal.    Her prenatal care is complicated by (and status): anxiety/depression, migraines and constipation     Review of Systems  Cramping/contractions : none   Vaginal bleeding: none   Fetal movement good     /60   Wt 77.1 kg (170 lb)   LMP 2023 (Approximate)   BMI 26.63 kg/m²     Prenatal Assessment  Fetal Heart Rate: 135  Fundal Height (cm): 20 cm  Movement: Present  Presentation: Transverse  Edema  LLE Edema: None  RLE Edema: None  Facial Edema: None        Assessment    Diagnoses and all orders for this visit:    1. Encounter for supervision of normal first pregnancy in second trimester (Primary)  -     POC Urinalysis Dipstick    2. Depression during pregnancy in first trimester    3. Encounter for other  screening follow-up  -     US Ob 14 + Weeks Single or First Gestation; Future    4. Pregnancy headache in second trimester    Other orders  -     magnesium oxide (MAG-OX) 400 MG tablet; Take 1 tablet by mouth Daily.  Dispense: 30 tablet; Refill: 3  -     promethazine (PHENERGAN) 25 MG tablet; Take 1 tablet by mouth Every 6 (Six) Hours As Needed for Nausea or Vomiting.  Dispense: 30 tablet; Refill: 2        1) pregnancy at 20w2d   Anatomic survey good except need to repeat heart - sub optimal  Cervical length reassuring   2) Depression   Stable off meds  3) Headache - migraine   Trial of magnesium oxide and phenergan   Discussed trial   Return if not controlling   4) constipation   Tried miralax in the past without help   Okay to trial mag citrate   Umbilical pain also reviewed       Plan    Reviewed this stage of pregnancy  Problem list updated   Follow up in 4 weeks - anatomic survey (repeat)     Malvin Hartley MD   2024  16:05 EST

## 2024-03-08 ENCOUNTER — ROUTINE PRENATAL (OUTPATIENT)
Dept: OBSTETRICS AND GYNECOLOGY | Facility: CLINIC | Age: 24
End: 2024-03-08
Payer: COMMERCIAL

## 2024-03-08 VITALS — BODY MASS INDEX: 27.25 KG/M2 | WEIGHT: 174 LBS | DIASTOLIC BLOOD PRESSURE: 71 MMHG | SYSTOLIC BLOOD PRESSURE: 118 MMHG

## 2024-03-08 DIAGNOSIS — Z36.9 ANTENATAL SCREENING ENCOUNTER: ICD-10-CM

## 2024-03-08 DIAGNOSIS — Z34.02 ENCOUNTER FOR SUPERVISION OF NORMAL FIRST PREGNANCY IN SECOND TRIMESTER: Primary | ICD-10-CM

## 2024-03-08 DIAGNOSIS — Z67.91 RH NEGATIVE STATUS DURING PREGNANCY IN SECOND TRIMESTER: ICD-10-CM

## 2024-03-08 DIAGNOSIS — O99.341 DEPRESSION DURING PREGNANCY IN FIRST TRIMESTER: ICD-10-CM

## 2024-03-08 DIAGNOSIS — Z36.2 ENCOUNTER FOR OTHER ANTENATAL SCREENING FOLLOW-UP: ICD-10-CM

## 2024-03-08 DIAGNOSIS — F32.A DEPRESSION DURING PREGNANCY IN FIRST TRIMESTER: ICD-10-CM

## 2024-03-08 DIAGNOSIS — O26.892 RH NEGATIVE STATUS DURING PREGNANCY IN SECOND TRIMESTER: ICD-10-CM

## 2024-03-08 LAB
GLUCOSE UR STRIP-MCNC: NEGATIVE MG/DL
PROT UR STRIP-MCNC: ABNORMAL MG/DL

## 2024-03-08 NOTE — PROGRESS NOTES
OB follow up     Marisol Moses is a 24 y.o.  24w5d being seen today for her obstetrical visit.  Patient reports no complaints. Fetal movement: normal.    Her prenatal care is complicated by (and status): anxiety/depression, migraines     Review of Systems  Cramping/contractions : none   Vaginal bleeding: none   Fetal movement good     /71   Wt 78.9 kg (174 lb)   LMP 2023 (Approximate)   BMI 27.25 kg/m²     Prenatal Assessment  Fetal Heart Rate: 146  Fundal Height (cm): 24 cm  Movement: Present  Presentation: Vertex  Edema  LLE Edema: None  RLE Edema: None  Facial Edema: None        Assessment    Diagnoses and all orders for this visit:    1. Encounter for supervision of normal first pregnancy in second trimester (Primary)  -     POC Urinalysis Dipstick    2. Encounter for other  screening follow-up    3. Depression during pregnancy in first trimester    4. Rh negative status during pregnancy in second trimester  -     Antibody Screen; Future    5.  screening encounter  -     Gestational Screen 1 Hr ([facility]); Future  -     CBC & Differential; Future  -     T Pallidum Antibody w/ reflex RPR (Syphilis); Future        1) pregnancy at 24w5d   GTT/CBC/RPR  ordered   2) Follow up anatomy   Growth AGA   Normal MARIEL, cephalic   Anatomic survey completed and normal   3) Rh negative, Rhogam next visit & ABS   4) Depression   Stable off meds      Plan    Reviewed this stage of pregnancy  Problem list updated   Follow up in 3 weeks    Malvin Hartley MD   3/8/2024  12:29 EST

## 2024-03-29 ENCOUNTER — ROUTINE PRENATAL (OUTPATIENT)
Dept: OBSTETRICS AND GYNECOLOGY | Facility: CLINIC | Age: 24
End: 2024-03-29
Payer: COMMERCIAL

## 2024-03-29 ENCOUNTER — TELEPHONE (OUTPATIENT)
Dept: OBSTETRICS AND GYNECOLOGY | Facility: CLINIC | Age: 24
End: 2024-03-29

## 2024-03-29 VITALS — SYSTOLIC BLOOD PRESSURE: 114 MMHG | DIASTOLIC BLOOD PRESSURE: 73 MMHG | BODY MASS INDEX: 28.51 KG/M2 | WEIGHT: 182 LBS

## 2024-03-29 DIAGNOSIS — F32.A DEPRESSION DURING PREGNANCY IN SECOND TRIMESTER: ICD-10-CM

## 2024-03-29 DIAGNOSIS — O99.342 DEPRESSION DURING PREGNANCY IN SECOND TRIMESTER: ICD-10-CM

## 2024-03-29 DIAGNOSIS — O26.892 RH NEGATIVE STATUS DURING PREGNANCY IN SECOND TRIMESTER: ICD-10-CM

## 2024-03-29 DIAGNOSIS — Z34.02 ENCOUNTER FOR SUPERVISION OF NORMAL FIRST PREGNANCY IN SECOND TRIMESTER: Primary | ICD-10-CM

## 2024-03-29 DIAGNOSIS — Z67.91 RH NEGATIVE STATUS DURING PREGNANCY IN SECOND TRIMESTER: ICD-10-CM

## 2024-03-29 LAB
GLUCOSE UR STRIP-MCNC: NEGATIVE MG/DL
PROT UR STRIP-MCNC: NEGATIVE MG/DL

## 2024-03-29 NOTE — TELEPHONE ENCOUNTER
Caller: Marisol Moses    Relationship: Self    Best call back number: [phone]    Caller requesting test results: PATIENT    What test was performed: GLUCOSE TEST    When was the test performed: 3/29/24    Where was the test performed: [facility]    Additional notes: PATIENT CALLED TO ASK WHEN SHE CAN EXPECT THE RESULTS OF THE GLUCOSE TEST THAT SHE HAD DONE TODAY - PER PRACTICCE, [facility] ADVISED THAT GLUCOSE TEST RESULTS ARE USUALLY AVAILABLE NEXT DAY AND SHE SHOULD HEAR SOMETHING ON MONDAY    PATIENT REQUESTING THAT WHEN THE OFFICE CALLS HER WITH THE RESULTS ON MONDAY THAT THEY CALL HER CELL PHONE [phone] ONLY AT OR AFTER 4:00 P.M. BECAUSE SHE WILL BE WORKING PRIOR TO THAT TIME

## 2024-04-01 RX ORDER — FERROUS SULFATE 325(65) MG
325 TABLET ORAL
Qty: 30 TABLET | Refills: 2 | Status: SHIPPED | OUTPATIENT
Start: 2024-04-01

## 2024-04-02 ENCOUNTER — TELEPHONE (OUTPATIENT)
Dept: OBSTETRICS AND GYNECOLOGY | Facility: CLINIC | Age: 24
End: 2024-04-02
Payer: COMMERCIAL

## 2024-04-02 NOTE — TELEPHONE ENCOUNTER
Patient of Dr. Hartley, she is calling with complaints of swelling. She states her knees and below it is very swollen, she cannot even get her compression socks over her feet and calves. She is worried about this possibly being preeclampsia. I asked if she is experiencing high BP or a constant headache and she denied both. Any advice for patient? Her next appointment isn't until 4/15. I did advise patient she could always go to L&D if it gets any worse.     Please advise, thanks!

## 2024-04-15 ENCOUNTER — ROUTINE PRENATAL (OUTPATIENT)
Dept: OBSTETRICS AND GYNECOLOGY | Facility: CLINIC | Age: 24
End: 2024-04-15
Payer: COMMERCIAL

## 2024-04-15 VITALS — WEIGHT: 195 LBS | SYSTOLIC BLOOD PRESSURE: 136 MMHG | DIASTOLIC BLOOD PRESSURE: 77 MMHG | BODY MASS INDEX: 30.54 KG/M2

## 2024-04-15 DIAGNOSIS — O12.03 GESTATIONAL EDEMA IN THIRD TRIMESTER: ICD-10-CM

## 2024-04-15 DIAGNOSIS — Z34.03 ENCOUNTER FOR SUPERVISION OF NORMAL FIRST PREGNANCY IN THIRD TRIMESTER: Primary | ICD-10-CM

## 2024-04-15 DIAGNOSIS — O99.013 ANEMIA IN PREGNANCY, THIRD TRIMESTER: ICD-10-CM

## 2024-04-15 DIAGNOSIS — F32.A DEPRESSION DURING PREGNANCY IN THIRD TRIMESTER: ICD-10-CM

## 2024-04-15 DIAGNOSIS — O26.843 FUNDAL HEIGHT LOW FOR DATES IN THIRD TRIMESTER: ICD-10-CM

## 2024-04-15 DIAGNOSIS — O99.343 DEPRESSION DURING PREGNANCY IN THIRD TRIMESTER: ICD-10-CM

## 2024-04-15 LAB
GLUCOSE UR STRIP-MCNC: NEGATIVE MG/DL
PROT UR STRIP-MCNC: ABNORMAL MG/DL

## 2024-04-15 PROCEDURE — 0502F SUBSEQUENT PRENATAL CARE: CPT | Performed by: OBSTETRICS & GYNECOLOGY

## 2024-04-15 NOTE — PROGRESS NOTES
OB follow up     Marisol Moses is a 24 y.o.  30w1d being seen today for her obstetrical visit.  Patient reports  swelling, unable to tolerate iron tablets, makes her sick to her stomach , seeing stars, some blurred vision.  . Fetal movement: normal.    Her prenatal care is complicated by (and status): anxiety/depression, migraines     Review of Systems  Cramping/contractions : none   Vaginal bleeding: none   Fetal movement good     /77   Wt 88.5 kg (195 lb)   LMP 2023 (Approximate)   BMI 30.54 kg/m²     Prenatal Assessment  Fetal Heart Rate: 154  Fundal Height (cm): 28 cm  Movement: Present  Edema  LLE Edema: Trace  RLE Edema: Trace  Facial Edema: None        Assessment    Diagnoses and all orders for this visit:    1. Encounter for supervision of normal first pregnancy in third trimester (Primary)  -     POC Urinalysis Dipstick    2. Depression during pregnancy in third trimester    3. Anemia in pregnancy, third trimester    4. Fundal height low for dates in third trimester  -     US Ob Follow Up Transabdominal Approach; Future    5. Gestational edema in third trimester  -     CBC & Differential  -     Comprehensive Metabolic Panel  -     Protein / Creatinine Ratio, Urine - Urine, Clean Catch        1) pregnancy at 30w1d   Rhogam last visit. (No ABS)   Passed 1 hour, syphilis screen negative.   Concerned with LE edema  Reviewed.   2) Anemia in pregnancy   Mild with Hg 10.1 g   Discussed iron rich foods vs iron supplements   3) FH low   Check growth next visit.   4) Hx of depression   Stable off medications   5) gestational edema  Discussed diagnostic criteria for pre-eclampsia   Will proceed with PIH screening   PIH warnings reviewed     Discussed pre-eclampsia/toxemia       Plan    Reviewed this stage of pregnancy  Problem list updated   Follow up in 2 weeks with growth scan     Malvin Hartley MD   4/15/2024  16:05 EDT

## 2024-04-16 LAB
ALBUMIN SERPL-MCNC: 3.2 G/DL (ref 3.5–5.2)
ALBUMIN/GLOB SERPL: 1.1 G/DL
ALP SERPL-CCNC: 123 U/L (ref 39–117)
ALT SERPL-CCNC: 21 U/L (ref 1–33)
AST SERPL-CCNC: 23 U/L (ref 1–32)
BASOPHILS # BLD AUTO: 0.04 10*3/MM3 (ref 0–0.2)
BASOPHILS NFR BLD AUTO: 0.3 % (ref 0–1.5)
BILIRUB SERPL-MCNC: <0.2 MG/DL (ref 0–1.2)
BUN SERPL-MCNC: 5 MG/DL (ref 6–20)
BUN/CREAT SERPL: 8.2 (ref 7–25)
CALCIUM SERPL-MCNC: 9.1 MG/DL (ref 8.6–10.5)
CHLORIDE SERPL-SCNC: 107 MMOL/L (ref 98–107)
CO2 SERPL-SCNC: 22.8 MMOL/L (ref 22–29)
CREAT SERPL-MCNC: 0.61 MG/DL (ref 0.57–1)
EGFRCR SERPLBLD CKD-EPI 2021: 128.2 ML/MIN/1.73
EOSINOPHIL # BLD AUTO: 0.36 10*3/MM3 (ref 0–0.4)
EOSINOPHIL NFR BLD AUTO: 3.1 % (ref 0.3–6.2)
ERYTHROCYTE [DISTWIDTH] IN BLOOD BY AUTOMATED COUNT: 12.9 % (ref 12.3–15.4)
GLOBULIN SER CALC-MCNC: 2.8 GM/DL
GLUCOSE SERPL-MCNC: 102 MG/DL (ref 65–99)
HCT VFR BLD AUTO: 30.4 % (ref 34–46.6)
HGB BLD-MCNC: 9.8 G/DL (ref 12–15.9)
IMM GRANULOCYTES # BLD AUTO: 0.15 10*3/MM3 (ref 0–0.05)
IMM GRANULOCYTES NFR BLD AUTO: 1.3 % (ref 0–0.5)
LYMPHOCYTES # BLD AUTO: 1.97 10*3/MM3 (ref 0.7–3.1)
LYMPHOCYTES NFR BLD AUTO: 17 % (ref 19.6–45.3)
MCH RBC QN AUTO: 29 PG (ref 26.6–33)
MCHC RBC AUTO-ENTMCNC: 32.2 G/DL (ref 31.5–35.7)
MCV RBC AUTO: 89.9 FL (ref 79–97)
MONOCYTES # BLD AUTO: 0.89 10*3/MM3 (ref 0.1–0.9)
MONOCYTES NFR BLD AUTO: 7.7 % (ref 5–12)
NEUTROPHILS # BLD AUTO: 8.19 10*3/MM3 (ref 1.7–7)
NEUTROPHILS NFR BLD AUTO: 70.6 % (ref 42.7–76)
NRBC BLD AUTO-RTO: 0 /100 WBC (ref 0–0.2)
PLATELET # BLD AUTO: 405 10*3/MM3 (ref 140–450)
POTASSIUM SERPL-SCNC: 4 MMOL/L (ref 3.5–5.2)
PROT SERPL-MCNC: 6 G/DL (ref 6–8.5)
RBC # BLD AUTO: 3.38 10*6/MM3 (ref 3.77–5.28)
SODIUM SERPL-SCNC: 141 MMOL/L (ref 136–145)
WBC # BLD AUTO: 11.6 10*3/MM3 (ref 3.4–10.8)

## 2024-04-22 ENCOUNTER — TELEPHONE (OUTPATIENT)
Dept: OBSTETRICS AND GYNECOLOGY | Facility: CLINIC | Age: 24
End: 2024-04-22
Payer: COMMERCIAL

## 2024-04-22 NOTE — TELEPHONE ENCOUNTER
----- Message from Malvin Hartley MD sent at 4/18/2024  4:35 PM EDT -----  HORTENCIA Albarran labs recently in office. She does not show evidence of pre-eclampsia or HELLP syndrome on blood work. P/C not resulting. May need to resend next visit. Please let her know. Thanks, Dr. Hartley

## 2024-05-01 ENCOUNTER — ROUTINE PRENATAL (OUTPATIENT)
Dept: OBSTETRICS AND GYNECOLOGY | Facility: CLINIC | Age: 24
End: 2024-05-01
Payer: COMMERCIAL

## 2024-05-01 VITALS — SYSTOLIC BLOOD PRESSURE: 129 MMHG | DIASTOLIC BLOOD PRESSURE: 73 MMHG | WEIGHT: 200 LBS | BODY MASS INDEX: 31.32 KG/M2

## 2024-05-01 DIAGNOSIS — O99.343 DEPRESSION DURING PREGNANCY IN THIRD TRIMESTER: ICD-10-CM

## 2024-05-01 DIAGNOSIS — F32.A DEPRESSION DURING PREGNANCY IN THIRD TRIMESTER: ICD-10-CM

## 2024-05-01 DIAGNOSIS — Z34.03 ENCOUNTER FOR SUPERVISION OF NORMAL FIRST PREGNANCY IN THIRD TRIMESTER: Primary | ICD-10-CM

## 2024-05-01 DIAGNOSIS — O99.013 ANEMIA IN PREGNANCY, THIRD TRIMESTER: ICD-10-CM

## 2024-05-01 DIAGNOSIS — O26.843 FUNDAL HEIGHT LOW FOR DATES IN THIRD TRIMESTER: ICD-10-CM

## 2024-05-01 LAB
GLUCOSE UR STRIP-MCNC: NEGATIVE MG/DL
PROT UR STRIP-MCNC: NEGATIVE MG/DL

## 2024-05-01 NOTE — PROGRESS NOTES
OB follow up     Marisol Moses is a 24 y.o.  32w3d being seen today for her obstetrical visit.  Patient reports no complaints. Fetal movement: normal.    Her prenatal care is complicated by (and status): anxiety/depression, migraines     Review of Systems  Cramping/contractions : none   Vaginal bleeding: none   Fetal movement good     /73   Wt 90.7 kg (200 lb)   LMP 2023 (Approximate)   BMI 31.32 kg/m²     Prenatal Assessment  Fetal Heart Rate: 156  Fundal Height (cm): 29 cm  Movement: Present  Edema  LLE Edema: Trace  RLE Edema: Trace  Facial Edema: None        Assessment    Diagnoses and all orders for this visit:    1. Encounter for supervision of normal first pregnancy in third trimester (Primary)  -     POC Urinalysis Dipstick    2. Depression during pregnancy in third trimester    3. Anemia in pregnancy, third trimester    4. Fundal height low for dates in third trimester        1) pregnancy at 32w3d   2) Anemia in pregnancy   Mild with Hg 10.1 g   Discussed iron rich foods vs iron supplements   3) FH low   Check growth next visit.   Ordered last visit, not scheduled or done??  4) Hx of depression   Stable off medications     Plan    Reviewed this stage of pregnancy  Problem list updated   Follow up in 2 weeks with growth     Malvin Hartley MD   2024  16:22 EDT

## 2024-05-13 ENCOUNTER — ROUTINE PRENATAL (OUTPATIENT)
Dept: OBSTETRICS AND GYNECOLOGY | Facility: CLINIC | Age: 24
End: 2024-05-13
Payer: COMMERCIAL

## 2024-05-13 VITALS — BODY MASS INDEX: 32.42 KG/M2 | WEIGHT: 207 LBS | SYSTOLIC BLOOD PRESSURE: 139 MMHG | DIASTOLIC BLOOD PRESSURE: 78 MMHG

## 2024-05-13 DIAGNOSIS — O99.013 ANEMIA IN PREGNANCY, THIRD TRIMESTER: ICD-10-CM

## 2024-05-13 DIAGNOSIS — O99.343 DEPRESSION DURING PREGNANCY IN THIRD TRIMESTER: ICD-10-CM

## 2024-05-13 DIAGNOSIS — R51.9 HEADACHE IN PREGNANCY, ANTEPARTUM, THIRD TRIMESTER: ICD-10-CM

## 2024-05-13 DIAGNOSIS — Z34.03 ENCOUNTER FOR SUPERVISION OF NORMAL FIRST PREGNANCY IN THIRD TRIMESTER: Primary | ICD-10-CM

## 2024-05-13 DIAGNOSIS — O12.03 GESTATIONAL EDEMA IN THIRD TRIMESTER: ICD-10-CM

## 2024-05-13 DIAGNOSIS — F32.A DEPRESSION DURING PREGNANCY IN THIRD TRIMESTER: ICD-10-CM

## 2024-05-13 DIAGNOSIS — R00.0 TACHYCARDIA: ICD-10-CM

## 2024-05-13 DIAGNOSIS — O26.893 HEADACHE IN PREGNANCY, ANTEPARTUM, THIRD TRIMESTER: ICD-10-CM

## 2024-05-13 LAB
GLUCOSE UR STRIP-MCNC: NEGATIVE MG/DL
PROT UR STRIP-MCNC: ABNORMAL MG/DL

## 2024-05-13 PROCEDURE — 0502F SUBSEQUENT PRENATAL CARE: CPT | Performed by: OBSTETRICS & GYNECOLOGY

## 2024-05-13 NOTE — PROGRESS NOTES
OB follow up     Marisol Moses is a 24 y.o.  34w1d being seen today for her obstetrical visit.  Patient reports  dizziness, headache, swelling, and increased heart rate.  . Fetal movement: normal.    Her prenatal care is complicated by (and status):  anxiety/depression, migraines     Review of Systems  Cramping/contractions : none   Vaginal bleeding: none   Fetal movement good     /78   Wt 93.9 kg (207 lb)   LMP 2023 (Approximate)   BMI 32.42 kg/m²     Prenatal Assessment  Fetal Heart Rate: 154  Fundal Height (cm): 32 cm  Movement: Present  Presentation: Vertex  Edema  LLE Edema: Mild pitting, slight indentation  RLE Edema: Mild pitting, slight indentation  Facial Edema: Trace        Assessment    Diagnoses and all orders for this visit:    1. Encounter for supervision of normal first pregnancy in third trimester (Primary)  -     POC Urinalysis Dipstick    2. Depression during pregnancy in third trimester    3. Anemia in pregnancy, third trimester    4. Gestational edema in third trimester  -     Protein / Creatinine Ratio, Urine - Urine, Clean Catch    5. Headache in pregnancy, antepartum, third trimester  -     CBC & Differential  -     Comprehensive Metabolic Panel  -     Protein / Creatinine Ratio, Urine - Urine, Clean Catch    6. Tachycardia  -     TSH Rfx On Abnormal To Free T4  -     CBC & Differential  -     Comprehensive Metabolic Panel        1) pregnancy at 34w1d   FH Low last visit   Growth AGA 51%ile, A/C 76%ile   Normal MARIEL, cephalic   2) Concern with gestational edema and other findings for pre-eclampsia (headache, dizziness, tachycardia)   Check labs again today including add thyroid   Rest this week (out of office)   Offered recheck Thursday or Monday of next week   BP is borderline with systolic of 139   Did have 7# weight gain from edema in 2 weeks   DTRs 2+, no clonus   3) Anemia in pregnancy   Mild with Hg 9.8 g   4) Hx of depression   Stable off medications      Plan    Reviewed this stage of pregnancy  Problem list updated   Follow up one week     Malvin Hartley MD   5/13/2024  14:16 EDT

## 2024-05-14 ENCOUNTER — TELEPHONE (OUTPATIENT)
Dept: OBSTETRICS AND GYNECOLOGY | Facility: CLINIC | Age: 24
End: 2024-05-14
Payer: COMMERCIAL

## 2024-05-14 LAB
ALBUMIN SERPL-MCNC: 3.2 G/DL (ref 3.5–5.2)
ALBUMIN/GLOB SERPL: 1.5 G/DL
ALP SERPL-CCNC: 140 U/L (ref 39–117)
ALT SERPL-CCNC: 24 U/L (ref 1–33)
AST SERPL-CCNC: 27 U/L (ref 1–32)
BASOPHILS # BLD AUTO: 0.02 10*3/MM3 (ref 0–0.2)
BASOPHILS NFR BLD AUTO: 0.2 % (ref 0–1.5)
BILIRUB SERPL-MCNC: <0.2 MG/DL (ref 0–1.2)
BUN SERPL-MCNC: 6 MG/DL (ref 6–20)
BUN/CREAT SERPL: 7.8 (ref 7–25)
CALCIUM SERPL-MCNC: 8.6 MG/DL (ref 8.6–10.5)
CHLORIDE SERPL-SCNC: 107 MMOL/L (ref 98–107)
CO2 SERPL-SCNC: 21.8 MMOL/L (ref 22–29)
CREAT SERPL-MCNC: 0.77 MG/DL (ref 0.57–1)
CREAT UR-MCNC: 57.7 MG/DL
EGFRCR SERPLBLD CKD-EPI 2021: 110.6 ML/MIN/1.73
EOSINOPHIL # BLD AUTO: 0.25 10*3/MM3 (ref 0–0.4)
EOSINOPHIL NFR BLD AUTO: 2.6 % (ref 0.3–6.2)
ERYTHROCYTE [DISTWIDTH] IN BLOOD BY AUTOMATED COUNT: 14 % (ref 12.3–15.4)
GLOBULIN SER CALC-MCNC: 2.2 GM/DL
GLUCOSE SERPL-MCNC: 75 MG/DL (ref 65–99)
HCT VFR BLD AUTO: 28.5 % (ref 34–46.6)
HGB BLD-MCNC: 9.2 G/DL (ref 12–15.9)
IMM GRANULOCYTES # BLD AUTO: 0.18 10*3/MM3 (ref 0–0.05)
IMM GRANULOCYTES NFR BLD AUTO: 1.9 % (ref 0–0.5)
LYMPHOCYTES # BLD AUTO: 1.52 10*3/MM3 (ref 0.7–3.1)
LYMPHOCYTES NFR BLD AUTO: 16.1 % (ref 19.6–45.3)
MCH RBC QN AUTO: 27.8 PG (ref 26.6–33)
MCHC RBC AUTO-ENTMCNC: 32.3 G/DL (ref 31.5–35.7)
MCV RBC AUTO: 86.1 FL (ref 79–97)
MONOCYTES # BLD AUTO: 0.87 10*3/MM3 (ref 0.1–0.9)
MONOCYTES NFR BLD AUTO: 9.2 % (ref 5–12)
NEUTROPHILS # BLD AUTO: 6.6 10*3/MM3 (ref 1.7–7)
NEUTROPHILS NFR BLD AUTO: 70 % (ref 42.7–76)
NRBC BLD AUTO-RTO: 0.2 /100 WBC (ref 0–0.2)
PLATELET # BLD AUTO: 370 10*3/MM3 (ref 140–450)
POTASSIUM SERPL-SCNC: 3.5 MMOL/L (ref 3.5–5.2)
PROT SERPL-MCNC: 5.4 G/DL (ref 6–8.5)
PROT UR-MCNC: 7.1 MG/DL
PROT/CREAT UR: 123.1 MG/G CREA (ref 0–200)
RBC # BLD AUTO: 3.31 10*6/MM3 (ref 3.77–5.28)
SODIUM SERPL-SCNC: 140 MMOL/L (ref 136–145)
TSH SERPL DL<=0.005 MIU/L-ACNC: 1.42 UIU/ML (ref 0.27–4.2)
WBC # BLD AUTO: 9.44 10*3/MM3 (ref 3.4–10.8)

## 2024-05-14 NOTE — PROGRESS NOTES
Avis, labs are okay - anemia still moderate with Hg of 9.2 g (should be > 10.5-11.0), Normal platelets, Normal liver and kidney function, normal thyroid and protein in the urine. So anemic but otherwise good. Please let her know. Thanks, Dr. Hartley

## 2024-05-20 ENCOUNTER — ROUTINE PRENATAL (OUTPATIENT)
Dept: OBSTETRICS AND GYNECOLOGY | Facility: CLINIC | Age: 24
End: 2024-05-20
Payer: COMMERCIAL

## 2024-05-20 VITALS — WEIGHT: 205 LBS | SYSTOLIC BLOOD PRESSURE: 138 MMHG | BODY MASS INDEX: 32.11 KG/M2 | DIASTOLIC BLOOD PRESSURE: 85 MMHG

## 2024-05-20 DIAGNOSIS — F32.A DEPRESSION DURING PREGNANCY IN THIRD TRIMESTER: ICD-10-CM

## 2024-05-20 DIAGNOSIS — O99.013 ANEMIA IN PREGNANCY, THIRD TRIMESTER: ICD-10-CM

## 2024-05-20 DIAGNOSIS — O12.03 GESTATIONAL EDEMA IN THIRD TRIMESTER: ICD-10-CM

## 2024-05-20 DIAGNOSIS — O99.343 DEPRESSION DURING PREGNANCY IN THIRD TRIMESTER: ICD-10-CM

## 2024-05-20 DIAGNOSIS — Z34.03 ENCOUNTER FOR SUPERVISION OF NORMAL FIRST PREGNANCY IN THIRD TRIMESTER: Primary | ICD-10-CM

## 2024-05-20 LAB
GLUCOSE UR STRIP-MCNC: NEGATIVE MG/DL
PROT UR STRIP-MCNC: ABNORMAL MG/DL

## 2024-05-20 PROCEDURE — 0502F SUBSEQUENT PRENATAL CARE: CPT | Performed by: OBSTETRICS & GYNECOLOGY

## 2024-05-20 NOTE — PROGRESS NOTES
OB follow up     Marisol Moses is a 24 y.o.  35w1d being seen today for her obstetrical visit.  Patient reports  dizziness . Fetal movement: normal.    Her prenatal care is complicated by (and status): anxiety/depression, migraines     Review of Systems  Cramping/contractions : none   Vaginal bleeding: none   Fetal movement good     /85   Wt 93 kg (205 lb)   LMP 2023 (Approximate)   BMI 32.11 kg/m²     Prenatal Assessment  Fetal Heart Rate: 146  Fundal Height (cm): 34 cm  Movement: Present  Presentation: Vertex  Edema  LLE Edema: Mild pitting, slight indentation  RLE Edema: Mild pitting, slight indentation  Facial Edema: None        Assessment    Diagnoses and all orders for this visit:    1. Encounter for supervision of normal first pregnancy in third trimester (Primary)  -     POC Urinalysis Dipstick    2. Depression during pregnancy in third trimester    3. Anemia in pregnancy, third trimester    4. Gestational edema in third trimester        1) pregnancy at 35w1d   2) Concern with gestational edema and other findings for pre-eclampsia (headache, dizziness, tachycardia)   Last week home numbers similar to today   PIH labs last week normal, reviewed   Thyroid also good   Discussed HDP and various diagnoses and treatments   DTRs 2+, no clonus   For now PIH warnings, [facility] BID and watch closely   Okay to work   3) Anemia in pregnancy   Mild with Hg 9.8 g   4) Hx of depression   Stable off medications       Plan    Reviewed this stage of pregnancy  Problem list updated   Follow up in 1 weeks    Malvin Hartley MD   2024  14:24 EDT

## 2024-05-30 ENCOUNTER — ROUTINE PRENATAL (OUTPATIENT)
Dept: OBSTETRICS AND GYNECOLOGY | Facility: CLINIC | Age: 24
End: 2024-05-30
Payer: COMMERCIAL

## 2024-05-30 VITALS — DIASTOLIC BLOOD PRESSURE: 88 MMHG | WEIGHT: 205.6 LBS | BODY MASS INDEX: 32.2 KG/M2 | SYSTOLIC BLOOD PRESSURE: 128 MMHG

## 2024-05-30 DIAGNOSIS — O99.343 DEPRESSION DURING PREGNANCY IN THIRD TRIMESTER: ICD-10-CM

## 2024-05-30 DIAGNOSIS — F32.A DEPRESSION DURING PREGNANCY IN THIRD TRIMESTER: ICD-10-CM

## 2024-05-30 DIAGNOSIS — O99.013 ANEMIA IN PREGNANCY, THIRD TRIMESTER: ICD-10-CM

## 2024-05-30 DIAGNOSIS — O12.03 GESTATIONAL EDEMA IN THIRD TRIMESTER: ICD-10-CM

## 2024-05-30 DIAGNOSIS — Z34.03 ENCOUNTER FOR SUPERVISION OF NORMAL FIRST PREGNANCY IN THIRD TRIMESTER: ICD-10-CM

## 2024-05-30 DIAGNOSIS — Z36.9 ANTENATAL SCREENING ENCOUNTER: ICD-10-CM

## 2024-05-30 DIAGNOSIS — Z3A.36 36 WEEKS GESTATION OF PREGNANCY: Primary | ICD-10-CM

## 2024-05-30 LAB
GLUCOSE UR STRIP-MCNC: NEGATIVE MG/DL
PROT UR STRIP-MCNC: NEGATIVE MG/DL

## 2024-05-30 RX ORDER — PRENATAL VIT/IRON FUM/FOLIC AC 27MG-0.8MG
TABLET ORAL DAILY
COMMUNITY

## 2024-05-30 NOTE — PROGRESS NOTES
Ob follow up    Marisol Moses is a 24 y.o.  36w4d patient being seen today for her obstetrical visit. Patient reports no complaints. Fetal movement: normal.    Her prenatal care is complicated by (and status) : anxiety/depression, migraines       ROS -   Fetal Movement none   Vaginal bleeding good   Cramping/Contractions intermittent d     /88   Wt 93.3 kg (205 lb 9.6 oz)   LMP 2023 (Approximate)   BMI 32.20 kg/m²     Prenatal Assessment  Fetal Heart Rate: 154  Fundal Height (cm): 35 cm  Movement: Present  Presentation: Vertex  Dilation/Effacement/Station  Dilation: Fingertip  Effacement (%): 40  Station: -2  Edema  LLE Edema: Mild pitting, slight indentation  RLE Edema: Mild pitting, slight indentation  Facial Edema: None      Assessment    Diagnoses and all orders for this visit:    1. 36 weeks gestation of pregnancy (Primary)  -     POC Urinalysis Dipstick    2. Encounter for supervision of normal first pregnancy in third trimester    3. Depression during pregnancy in third trimester    4. Anemia in pregnancy, third trimester    5. Gestational edema in third trimester    6.  screening encounter  -     Strep Grp B JUAN + Reflex - Swab, Vaginal/Rectum        1) Pregnancy at 36w4d  2) Fetal status reassuring   3) GBS status - done today   4) Concern with gestational edema and other findings for pre-eclampsia (headache, dizziness)   Last several weeks with borderline but not high BP  Reports to have had a few home BP > 140.   PIH labs recently normal  Thyroid also good   Discussed HDP and various diagnoses and treatments   DTRs 2+, no clonus   For now PIH warnings, [facility] BID and watch closely   Okay to work   3) Anemia in pregnancy   Mild with Hg 9.8 g   4) Hx of depression   Stable off medications     Plan    Labor warnings   [facility] BID        Malvin Hartley MD   2024  16:50 EDT

## 2024-05-31 ENCOUNTER — TELEPHONE (OUTPATIENT)
Dept: INTERNAL MEDICINE | Age: 24
End: 2024-05-31

## 2024-05-31 ENCOUNTER — TELEPHONE (OUTPATIENT)
Dept: OBSTETRICS AND GYNECOLOGY | Facility: CLINIC | Age: 24
End: 2024-05-31
Payer: COMMERCIAL

## 2024-05-31 RX ORDER — ESCITALOPRAM OXALATE 10 MG/1
10 TABLET ORAL DAILY
Qty: 30 TABLET | Refills: 2 | Status: SHIPPED | OUTPATIENT
Start: 2024-05-31 | End: 2025-05-31

## 2024-05-31 NOTE — TELEPHONE ENCOUNTER
Ronit,     Rx sent   Should be fine with breast feeding. We can go over pros and cons with her next visit.     Thanks,   Dr. Hartley    36wk5d. OB 5/30/24. Requesting to get an Rx for escitalopram and wants to know if this will interfere with breastfeeding. [facility]. Thank you

## 2024-05-31 NOTE — TELEPHONE ENCOUNTER
Caller: Marisol Moses    Relationship: Self    Best call back number: [phone]      Who are you requesting to speak with (clinical staff, DR. TAPIA      What was the call regarding: PATIENT ADVISED THAT SHE IS AT THE END OF HER PREGNANCY WOULD LIKE TO KNOW IF SHE CAN BE PUT BACK ON ESCITALOPRAM AND WOULD THIS INTERFERE WITH BREAST FEEDING. PLEASE ADVISE.

## 2024-05-31 NOTE — TELEPHONE ENCOUNTER
Caller: Marisol Moses    Relationship: Self    Best call back number: [phone]    What test was performed: GENETIC TESTING     When was the test performed: ABOUT 9 MONTHS AGO    Additional notes: PATIENT HAD GENETIC TESTING DONE ABOUT 9 MONTHS AGO WITH JOSE TO FIND OUT WHICH MEDICATIONS WOULD WORK WELL FOR HER. SHE IS WANTING TO KNOW THE RESULTS TO THE TEST.

## 2024-06-01 LAB — GP B STREP DNA SPEC QL NAA+PROBE: NEGATIVE

## 2024-06-07 ENCOUNTER — ROUTINE PRENATAL (OUTPATIENT)
Dept: OBSTETRICS AND GYNECOLOGY | Facility: CLINIC | Age: 24
End: 2024-06-07
Payer: COMMERCIAL

## 2024-06-07 VITALS — WEIGHT: 214 LBS | DIASTOLIC BLOOD PRESSURE: 91 MMHG | BODY MASS INDEX: 33.52 KG/M2 | SYSTOLIC BLOOD PRESSURE: 147 MMHG

## 2024-06-07 DIAGNOSIS — Z34.03 ENCOUNTER FOR SUPERVISION OF NORMAL FIRST PREGNANCY IN THIRD TRIMESTER: Primary | ICD-10-CM

## 2024-06-07 DIAGNOSIS — O13.3 GESTATIONAL HYPERTENSION, THIRD TRIMESTER: ICD-10-CM

## 2024-06-07 DIAGNOSIS — O99.343 DEPRESSION DURING PREGNANCY IN THIRD TRIMESTER: ICD-10-CM

## 2024-06-07 DIAGNOSIS — O99.013 ANEMIA IN PREGNANCY, THIRD TRIMESTER: ICD-10-CM

## 2024-06-07 DIAGNOSIS — F32.A DEPRESSION DURING PREGNANCY IN THIRD TRIMESTER: ICD-10-CM

## 2024-06-07 LAB
GLUCOSE UR STRIP-MCNC: NEGATIVE MG/DL
PROT UR STRIP-MCNC: NEGATIVE MG/DL

## 2024-06-07 RX ORDER — ONDANSETRON 2 MG/ML
4 INJECTION INTRAMUSCULAR; INTRAVENOUS EVERY 6 HOURS PRN
Status: CANCELLED | OUTPATIENT
Start: 2024-06-07

## 2024-06-07 RX ORDER — ACETAMINOPHEN 325 MG/1
650 TABLET ORAL EVERY 4 HOURS PRN
Status: CANCELLED | OUTPATIENT
Start: 2024-06-07

## 2024-06-07 RX ORDER — ONDANSETRON 4 MG/1
4 TABLET, ORALLY DISINTEGRATING ORAL EVERY 6 HOURS PRN
OUTPATIENT
Start: 2024-06-07

## 2024-06-07 RX ORDER — OXYTOCIN/0.9 % SODIUM CHLORIDE 30/500 ML
999 PLASTIC BAG, INJECTION (ML) INTRAVENOUS ONCE
OUTPATIENT
Start: 2024-06-07 | End: 2024-06-07

## 2024-06-07 RX ORDER — OXYTOCIN/0.9 % SODIUM CHLORIDE 30/500 ML
250 PLASTIC BAG, INJECTION (ML) INTRAVENOUS CONTINUOUS
OUTPATIENT
Start: 2024-06-07 | End: 2024-06-07

## 2024-06-07 RX ORDER — MAGNESIUM CARB/ALUMINUM HYDROX 105-160MG
30 TABLET,CHEWABLE ORAL ONCE AS NEEDED
Status: CANCELLED | OUTPATIENT
Start: 2024-06-07

## 2024-06-07 RX ORDER — ONDANSETRON 2 MG/ML
4 INJECTION INTRAMUSCULAR; INTRAVENOUS EVERY 6 HOURS PRN
OUTPATIENT
Start: 2024-06-07

## 2024-06-07 RX ORDER — TERBUTALINE SULFATE 1 MG/ML
0.25 INJECTION, SOLUTION SUBCUTANEOUS AS NEEDED
Status: CANCELLED | OUTPATIENT
Start: 2024-06-07

## 2024-06-07 RX ORDER — LIDOCAINE HYDROCHLORIDE 10 MG/ML
0.5 INJECTION, SOLUTION INFILTRATION; PERINEURAL ONCE AS NEEDED
Status: CANCELLED | OUTPATIENT
Start: 2024-06-07

## 2024-06-07 RX ORDER — MISOPROSTOL 100 UG/1
25 TABLET ORAL
Status: CANCELLED | OUTPATIENT
Start: 2024-06-07 | End: 2024-06-08

## 2024-06-07 RX ORDER — FAMOTIDINE 10 MG
20 TABLET ORAL 2 TIMES DAILY PRN
Status: CANCELLED | OUTPATIENT
Start: 2024-06-07

## 2024-06-07 RX ORDER — METHYLERGONOVINE MALEATE 0.2 MG/ML
200 INJECTION INTRAVENOUS ONCE AS NEEDED
OUTPATIENT
Start: 2024-06-07

## 2024-06-07 RX ORDER — CARBOPROST TROMETHAMINE 250 UG/ML
250 INJECTION, SOLUTION INTRAMUSCULAR
OUTPATIENT
Start: 2024-06-07

## 2024-06-07 RX ORDER — ONDANSETRON 4 MG/1
4 TABLET, ORALLY DISINTEGRATING ORAL EVERY 6 HOURS PRN
Status: CANCELLED | OUTPATIENT
Start: 2024-06-07

## 2024-06-07 RX ORDER — MISOPROSTOL 100 UG/1
800 TABLET ORAL ONCE AS NEEDED
OUTPATIENT
Start: 2024-06-07

## 2024-06-07 RX ORDER — FAMOTIDINE 10 MG/ML
20 INJECTION, SOLUTION INTRAVENOUS 2 TIMES DAILY PRN
Status: CANCELLED | OUTPATIENT
Start: 2024-06-07

## 2024-06-07 RX ORDER — SODIUM CHLORIDE 0.9 % (FLUSH) 0.9 %
10 SYRINGE (ML) INJECTION EVERY 12 HOURS SCHEDULED
Status: CANCELLED | OUTPATIENT
Start: 2024-06-07

## 2024-06-07 RX ORDER — SODIUM CHLORIDE 9 MG/ML
40 INJECTION, SOLUTION INTRAVENOUS AS NEEDED
Status: CANCELLED | OUTPATIENT
Start: 2024-06-07

## 2024-06-07 RX ORDER — SODIUM CHLORIDE 0.9 % (FLUSH) 0.9 %
10 SYRINGE (ML) INJECTION AS NEEDED
Status: CANCELLED | OUTPATIENT
Start: 2024-06-07

## 2024-06-07 RX ORDER — SODIUM CHLORIDE, SODIUM LACTATE, POTASSIUM CHLORIDE, CALCIUM CHLORIDE 600; 310; 30; 20 MG/100ML; MG/100ML; MG/100ML; MG/100ML
125 INJECTION, SOLUTION INTRAVENOUS CONTINUOUS
Status: CANCELLED | OUTPATIENT
Start: 2024-06-07

## 2024-06-07 NOTE — LETTER
24    SCHEDULING FORM  C-SECTIONS/INDUCTIONS    Patient:  Marisol Moses  :  2000    Phone: [phone] (home)     OB provider:  Malvin Hartley MD    Summary:  24 y.o. female     Type of Delivery:  Induction   Priority:  Medical    Desired Date: 24      Time: 0001    Dating   Estimated Date of Delivery: 24    Gestational age at Desired date of Induction or CS: 38 weeks 0 days  ----------------------------------------------------------------------------  By ACOG Guidelines, women should be 39 weeks or greater before initiating an elective induction (non-medically indicated) delivery     Maternal Indications:   Hypertension complicating pregnancy:  cHTN, gHTN, HELLP, PreE    Fetal/Placental Conditions:      ----------------------------------------------------------------------------    For patients less than 39 weeks with indications not included in the above list, [facility] consult is required prior to scheduling.    [facility] Approved indication:       Date of consult:      I attest that the above entries are accurate to the best of my knowledge:    Malvin Hartley MD  2024  17:33 EDT

## 2024-06-07 NOTE — PROGRESS NOTES
Ob follow up    Marisol Moses is a 24 y.o.  37w5d patient being seen today for her obstetrical visit. Patient reports no complaints. Fetal movement: normal.    Her prenatal care is complicated by (and status) : anxiety/depression, migraines       ROS -   Fetal Movement good   Vaginal bleeding none   Cramping/Contractions intermittent      /91   Wt 97.1 kg (214 lb)   LMP 2023 (Approximate)   BMI 33.52 kg/m²     Prenatal Assessment  Fetal Heart Rate: 156  Fundal Height (cm): 35 cm  Movement: Present  Presentation: Vertex  Dilation/Effacement/Station  Dilation: Fingertip  Effacement (%): 50  Station: -2  Edema  LLE Edema: Moderate pitting, indentation subsides rapidly  RLE Edema: Moderate pitting, indentation subsides rapidly  Facial Edema: None      Assessment    Diagnoses and all orders for this visit:    1. Encounter for supervision of normal first pregnancy in third trimester (Primary)  -     POC Urinalysis Dipstick    2. Gestational hypertension, third trimester  -     Labor Induction  -     sodium chloride 0.9 % flush 10 mL  -     sodium chloride 0.9 % flush 10 mL  -     sodium chloride 0.9 % infusion 40 mL  -     lidocaine (XYLOCAINE) 1 % injection 0.5 mL  -     lactated ringers bolus 1,000 mL  -     lactated ringers infusion  -     acetaminophen (TYLENOL) tablet 650 mg  -     ondansetron ODT (ZOFRAN-ODT) disintegrating tablet 4 mg  -     ondansetron (ZOFRAN) injection 4 mg  -     miSOPROStol (CYTOTEC) tablet 25 mcg  -     terbutaline (BRETHINE) injection 0.25 mg  -     mineral oil liquid 30 mL  -     famotidine (PEPCID) injection 20 mg  -     famotidine (PEPCID) tablet 20 mg  -     oxytocin (PITOCIN) 30 units in 0.9% sodium chloride 500 mL (premix)  -     oxytocin (PITOCIN) 30 units in 0.9% sodium chloride 500 mL (premix)  -     methylergonovine (METHERGINE) injection 200 mcg  -     carboprost (HEMABATE) injection 250 mcg  -     miSOPROStol (CYTOTEC) tablet 800 mcg  -     Tranexamic  Acid 1,000 mg in sodium chloride 0.9 % 100 mL  -     ondansetron ODT (ZOFRAN-ODT) disintegrating tablet 4 mg  -     ondansetron (ZOFRAN) injection 4 mg    3. Depression during pregnancy in third trimester    4. Anemia in pregnancy, third trimester    Other orders  -     Admit To Obstetrics Inpatient; Standing  -     Code Status and Medical Interventions:; Standing  -     Obtain Informed Consent; Standing  -     Place Sequential Compression Device; Standing  -     Maintain Sequential Compression Device; Standing  -     Vital Signs q 4 while awake; Standing  -     Vital Signs Per Hospital Policy; Standing  -     Mini-Prep Prior to Delivery; Standing  -     Continuous Fetal Monitoring With NST on Admission and Prior to Initiation of Oxytocin.; Standing  -     External Uterine Contraction Monitoring; Standing  -     Notify Provider (Specified); Standing  -     Notify Provider of Tachysystole (Per Hospital Algorithm); Standing  -     Notify Provider if Membranes Ruptured, Bleeding Greater Than 1 Pad Per Hour, Fetal Heart Tone Abnormality or Severe Pain; Standing  -     May Ambulate if Membranes Intact or Head Engaged With Ruptured BOW or Normal Tracing for 20 Minutes; Standing  -     Initiate Group Beta Strep (GBS) Prophylaxis Protocol, If Criteria Met; Standing  -     Position Change - For Intra-Uterine Resusitation for Hypertonus, HyperStimulation or Non-Reassuring Fetal Status; Standing  -     NPO Diet NPO Type: Ice Chips; Standing  -     Inpatient Anesthesiology Consult; Standing  -     T Pallidum Antibody w/ reflex RPR (Syphilis); Standing  -     CBC & Differential; Standing  -     Comprehensive Metabolic Panel; Standing  -     Type & Screen; Standing  -     Insert Peripheral IV; Standing  -     Saline Lock & Maintain IV Access; Standing  -     Notify Provider (Specified); Standing  -     Vital Signs Per Hospital Policy; Standing  -     Fundal & Lochia Check; Standing  -     Fundal & Lochia Check; Standing  -      Diet: Regular/House; Fluid Consistency: Thin (IDDSI 0); Standing  -     Advance Diet As Tolerated -; Standing        1) Pregnancy at 37w5d  2) Fetal status reassuring   3) GBS status - negative   4) Anemia in pregnancy   Mild with Hg 9.8 g   5) Hx of depression   Stable on lexapro   6) Gestational hypertension   Has been swelling like getting ready for this to come up. Has checked BP at home and BP was occasionally 140's range.  Today BP > 140/90 to be consistent with this diagnosis.    Repeat in office was 130/80   DTRs 2+, no clonus   No PIH symptoms     Discussed situation at length.   But given her clinical appearance, I believe she has HDP and specifically Gest HTN   As she is now term, I think we should move to induction   Expectations for induction reviewed along with instructions   Scheduled for 6/9/24 at 12:01 AM   To OB ED with any concerns before this.     Plan    Labor warnings   [facility] [place]        Malvin Hartley MD   6/7/2024  17:39 EDT

## 2024-06-09 ENCOUNTER — ANESTHESIA EVENT (OUTPATIENT)
Dept: LABOR AND DELIVERY | Facility: HOSPITAL | Age: 24
End: 2024-06-09
Payer: COMMERCIAL

## 2024-06-09 ENCOUNTER — ANESTHESIA (OUTPATIENT)
Dept: LABOR AND DELIVERY | Facility: HOSPITAL | Age: 24
End: 2024-06-09
Payer: COMMERCIAL

## 2024-06-09 ENCOUNTER — HOSPITAL ENCOUNTER (INPATIENT)
Dept: LABOR AND DELIVERY | Facility: HOSPITAL | Age: 24
Discharge: HOME | End: 2024-06-09
Payer: COMMERCIAL

## 2024-06-09 ENCOUNTER — HOSPITAL ENCOUNTER (INPATIENT)
Facility: HOSPITAL | Age: 24
LOS: 4 days | Discharge: HOME | End: 2024-06-13
Attending: OBSTETRICS & GYNECOLOGY | Admitting: OBSTETRICS & GYNECOLOGY
Payer: COMMERCIAL

## 2024-06-09 DIAGNOSIS — O13.3 GESTATIONAL HYPERTENSION, THIRD TRIMESTER: ICD-10-CM

## 2024-06-09 LAB
ABO GROUP BLD: NORMAL
ALBUMIN SERPL-MCNC: 3.5 G/DL (ref 3.5–5.2)
ALBUMIN/GLOB SERPL: 1.2 G/DL
ALP SERPL-CCNC: 179 U/L (ref 39–117)
ALT SERPL W P-5'-P-CCNC: 21 U/L (ref 1–33)
ANION GAP SERPL CALCULATED.3IONS-SCNC: 13.3 MMOL/L (ref 5–15)
AST SERPL-CCNC: 31 U/L (ref 1–32)
BASOPHILS # BLD AUTO: 0.02 10*3/MM3 (ref 0–0.2)
BASOPHILS NFR BLD AUTO: 0.2 % (ref 0–1.5)
BILIRUB SERPL-MCNC: 0.2 MG/DL (ref 0–1.2)
BLD GP AB SCN SERPL QL: POSITIVE
BUN SERPL-MCNC: 6 MG/DL (ref 6–20)
BUN/CREAT SERPL: 10.2 (ref 7–25)
CALCIUM SPEC-SCNC: 9.6 MG/DL (ref 8.6–10.5)
CHLORIDE SERPL-SCNC: 105 MMOL/L (ref 98–107)
CO2 SERPL-SCNC: 19.7 MMOL/L (ref 22–29)
CREAT SERPL-MCNC: 0.59 MG/DL (ref 0.57–1)
DEPRECATED RDW RBC AUTO: 42.6 FL (ref 37–54)
EGFRCR SERPLBLD CKD-EPI 2021: 129.3 ML/MIN/1.73
EOSINOPHIL # BLD AUTO: 0.3 10*3/MM3 (ref 0–0.4)
EOSINOPHIL NFR BLD AUTO: 2.7 % (ref 0.3–6.2)
ERYTHROCYTE [DISTWIDTH] IN BLOOD BY AUTOMATED COUNT: 14.8 % (ref 12.3–15.4)
GLOBULIN UR ELPH-MCNC: 3 GM/DL
GLUCOSE SERPL-MCNC: 90 MG/DL (ref 65–99)
HCT VFR BLD AUTO: 28.2 % (ref 34–46.6)
HGB BLD-MCNC: 9 G/DL (ref 12–15.9)
IMM GRANULOCYTES # BLD AUTO: 0.32 10*3/MM3 (ref 0–0.05)
IMM GRANULOCYTES NFR BLD AUTO: 2.8 % (ref 0–0.5)
LYMPHOCYTES # BLD AUTO: 1.68 10*3/MM3 (ref 0.7–3.1)
LYMPHOCYTES NFR BLD AUTO: 14.9 % (ref 19.6–45.3)
MCH RBC QN AUTO: 25.6 PG (ref 26.6–33)
MCHC RBC AUTO-ENTMCNC: 31.9 G/DL (ref 31.5–35.7)
MCV RBC AUTO: 80.1 FL (ref 79–97)
MONOCYTES # BLD AUTO: 1 10*3/MM3 (ref 0.1–0.9)
MONOCYTES NFR BLD AUTO: 8.9 % (ref 5–12)
NEUTROPHILS NFR BLD AUTO: 7.93 10*3/MM3 (ref 1.7–7)
NEUTROPHILS NFR BLD AUTO: 70.5 % (ref 42.7–76)
NRBC BLD AUTO-RTO: 0.5 /100 WBC (ref 0–0.2)
PLATELET # BLD AUTO: 408 10*3/MM3 (ref 140–450)
PMV BLD AUTO: 10.2 FL (ref 6–12)
POTASSIUM SERPL-SCNC: 3.7 MMOL/L (ref 3.5–5.2)
PROT SERPL-MCNC: 6.5 G/DL (ref 6–8.5)
RBC # BLD AUTO: 3.52 10*6/MM3 (ref 3.77–5.28)
RESIDUAL RHIG DETECTED: NORMAL
RH BLD: NEGATIVE
SODIUM SERPL-SCNC: 138 MMOL/L (ref 136–145)
T PALLIDUM IGG SER QL: NORMAL
T&S EXPIRATION DATE: NORMAL
WBC NRBC COR # BLD AUTO: 11.25 10*3/MM3 (ref 3.4–10.8)

## 2024-06-09 PROCEDURE — 25810000003 LACTATED RINGERS PER 1000 ML: Performed by: OBSTETRICS & GYNECOLOGY

## 2024-06-09 PROCEDURE — 86850 RBC ANTIBODY SCREEN: CPT | Performed by: OBSTETRICS & GYNECOLOGY

## 2024-06-09 PROCEDURE — 86900 BLOOD TYPING SEROLOGIC ABO: CPT | Performed by: OBSTETRICS & GYNECOLOGY

## 2024-06-09 PROCEDURE — 86780 TREPONEMA PALLIDUM: CPT | Performed by: OBSTETRICS & GYNECOLOGY

## 2024-06-09 PROCEDURE — 85025 COMPLETE CBC W/AUTO DIFF WBC: CPT | Performed by: OBSTETRICS & GYNECOLOGY

## 2024-06-09 PROCEDURE — 86901 BLOOD TYPING SEROLOGIC RH(D): CPT | Performed by: OBSTETRICS & GYNECOLOGY

## 2024-06-09 PROCEDURE — 3E0P7VZ INTRODUCTION OF HORMONE INTO FEMALE REPRODUCTIVE, VIA NATURAL OR ARTIFICIAL OPENING: ICD-10-PCS | Performed by: OBSTETRICS & GYNECOLOGY

## 2024-06-09 PROCEDURE — 80053 COMPREHEN METABOLIC PANEL: CPT | Performed by: OBSTETRICS & GYNECOLOGY

## 2024-06-09 PROCEDURE — 86870 RBC ANTIBODY IDENTIFICATION: CPT | Performed by: OBSTETRICS & GYNECOLOGY

## 2024-06-09 PROCEDURE — 3E0DXGC INTRODUCTION OF OTHER THERAPEUTIC SUBSTANCE INTO MOUTH AND PHARYNX, EXTERNAL APPROACH: ICD-10-PCS | Performed by: OBSTETRICS & GYNECOLOGY

## 2024-06-09 RX ORDER — MISOPROSTOL 100 MCG
25 TABLET ORAL
Status: COMPLETED | OUTPATIENT
Start: 2024-06-09 | End: 2024-06-09

## 2024-06-09 RX ORDER — FERROUS SULFATE 325(65) MG
325 TABLET ORAL
Status: DISCONTINUED | OUTPATIENT
Start: 2024-06-10 | End: 2024-06-09

## 2024-06-09 RX ORDER — FAMOTIDINE 20 MG/1
20 TABLET, FILM COATED ORAL 2 TIMES DAILY PRN
Status: DISCONTINUED | OUTPATIENT
Start: 2024-06-09 | End: 2024-06-11 | Stop reason: HOSPADM

## 2024-06-09 RX ORDER — FENTANYL/ROPIVACAINE/NS/PF 2MCG/ML-.2
10 PLASTIC BAG, INJECTION (ML) INJECTION CONTINUOUS
Status: DISPENSED | OUTPATIENT
Start: 2024-06-09 | End: 2024-06-13

## 2024-06-09 RX ORDER — ONDANSETRON 2 MG/ML
4 INJECTION INTRAMUSCULAR; INTRAVENOUS EVERY 6 HOURS PRN
Status: DISCONTINUED | OUTPATIENT
Start: 2024-06-09 | End: 2024-06-11 | Stop reason: HOSPADM

## 2024-06-09 RX ORDER — ONDANSETRON 4 MG/1
4 TABLET, ORALLY DISINTEGRATING ORAL EVERY 6 HOURS PRN
Status: DISCONTINUED | OUTPATIENT
Start: 2024-06-09 | End: 2024-06-11 | Stop reason: HOSPADM

## 2024-06-09 RX ORDER — TERBUTALINE SULFATE 1 MG/ML
0.25 INJECTION, SOLUTION SUBCUTANEOUS AS NEEDED
Status: DISCONTINUED | OUTPATIENT
Start: 2024-06-09 | End: 2024-06-11 | Stop reason: HOSPADM

## 2024-06-09 RX ORDER — EPHEDRINE SULFATE 50 MG/ML
10 INJECTION, SOLUTION INTRAVENOUS
Status: DISCONTINUED | OUTPATIENT
Start: 2024-06-09 | End: 2024-06-11 | Stop reason: HOSPADM

## 2024-06-09 RX ORDER — ACETAMINOPHEN 325 MG/1
650 TABLET ORAL EVERY 4 HOURS PRN
Status: DISCONTINUED | OUTPATIENT
Start: 2024-06-09 | End: 2024-06-11 | Stop reason: HOSPADM

## 2024-06-09 RX ORDER — SODIUM CHLORIDE 0.9 % (FLUSH) 0.9 %
10 SYRINGE (ML) INJECTION AS NEEDED
Status: DISCONTINUED | OUTPATIENT
Start: 2024-06-09 | End: 2024-06-11 | Stop reason: HOSPADM

## 2024-06-09 RX ORDER — FAMOTIDINE 10 MG/ML
20 INJECTION, SOLUTION INTRAVENOUS 2 TIMES DAILY PRN
Status: DISCONTINUED | OUTPATIENT
Start: 2024-06-09 | End: 2024-06-11 | Stop reason: HOSPADM

## 2024-06-09 RX ORDER — MAGNESIUM CARB/ALUMINUM HYDROX 105-160MG
30 TABLET,CHEWABLE ORAL ONCE AS NEEDED
Status: DISCONTINUED | OUTPATIENT
Start: 2024-06-09 | End: 2024-06-11 | Stop reason: HOSPADM

## 2024-06-09 RX ORDER — SODIUM CHLORIDE 0.9 % (FLUSH) 0.9 %
10 SYRINGE (ML) INJECTION EVERY 12 HOURS SCHEDULED
Status: DISCONTINUED | OUTPATIENT
Start: 2024-06-09 | End: 2024-06-11 | Stop reason: HOSPADM

## 2024-06-09 RX ORDER — SODIUM CHLORIDE 9 MG/ML
40 INJECTION, SOLUTION INTRAVENOUS AS NEEDED
Status: DISCONTINUED | OUTPATIENT
Start: 2024-06-09 | End: 2024-06-11 | Stop reason: HOSPADM

## 2024-06-09 RX ORDER — LIDOCAINE HYDROCHLORIDE 10 MG/ML
0.5 INJECTION, SOLUTION INFILTRATION; PERINEURAL ONCE AS NEEDED
Status: DISCONTINUED | OUTPATIENT
Start: 2024-06-09 | End: 2024-06-11 | Stop reason: HOSPADM

## 2024-06-09 RX ORDER — ESCITALOPRAM OXALATE 10 MG/1
10 TABLET ORAL DAILY
Status: DISCONTINUED | OUTPATIENT
Start: 2024-06-09 | End: 2024-06-09

## 2024-06-09 RX ORDER — SODIUM CHLORIDE, SODIUM LACTATE, POTASSIUM CHLORIDE, CALCIUM CHLORIDE 600; 310; 30; 20 MG/100ML; MG/100ML; MG/100ML; MG/100ML
125 INJECTION, SOLUTION INTRAVENOUS CONTINUOUS
Status: DISCONTINUED | OUTPATIENT
Start: 2024-06-09 | End: 2024-06-13 | Stop reason: HOSPADM

## 2024-06-09 RX ORDER — OXYTOCIN/0.9 % SODIUM CHLORIDE 30/500 ML
2-20 PLASTIC BAG, INJECTION (ML) INTRAVENOUS
Status: DISCONTINUED | OUTPATIENT
Start: 2024-06-09 | End: 2024-06-10

## 2024-06-09 RX ADMIN — FAMOTIDINE 20 MG: 10 INJECTION INTRAVENOUS at 09:39

## 2024-06-09 RX ADMIN — Medication 2 MILLI-UNITS/MIN: at 16:24

## 2024-06-09 RX ADMIN — Medication 25 MCG: at 09:30

## 2024-06-09 RX ADMIN — DINOPROSTONE 10 MG: 10 INSERT VAGINAL at 20:55

## 2024-06-09 RX ADMIN — SODIUM CHLORIDE, POTASSIUM CHLORIDE, SODIUM LACTATE AND CALCIUM CHLORIDE 125 ML/HR: 600; 310; 30; 20 INJECTION, SOLUTION INTRAVENOUS at 16:22

## 2024-06-09 RX ADMIN — Medication 25 MCG: at 13:30

## 2024-06-09 NOTE — PROGRESS NOTES
Misoprostol x 2   Some improvement in exam   Changed to pitocin,   Up to 6 MIU but not having any evidence of change  Discussed alternatives, but I think she should go back to cervical ripening.     - cervical balloon (but she may need epidural to tolerate)   - Cervidil   - misoprostol     Given need for epidural decided to do cervidil next   Okay to have dinner and place     Malvin Hartley MD  6/9/2024  19:34 EDT

## 2024-06-09 NOTE — H&P
[facility]  Obstetric History and Physical    Chief Complaint   Patient presents with    Scheduled Induction     Pt is IOL for GHTN. Pt denies vaginal bleeding or LOF. Pt reports + fetal movement.            Patient is a 24 y.o. female  currently at 38w0d, who presents with scheduled medical induction for gestational hypertension.    Her prenatal care was with Dr. Hartley and was complicated by recent onset of gestational hypertension, anemia, and depression stable on lexapro.        External Prenatal Results       Pregnancy Outside Results - Transcribed From Office Records - See Scanned Records For Details       Test Value Date Time    ABO  A  24 0807    Rh  Negative  24 0807    Antibody Screen  Positive  24 0807       Negative  23 1441    Varicella IgG  531 index 24 1228    Rubella  1.50 index 24 1228       1.76 index 23 1441    Hgb  9.0 g/dL 24 0807       9.2 g/dL 24 1449       9.8 g/dL 04/15/24 1632       10.1 g/dL 24 1145       12.5 g/dL 23 1441    Hct  28.2 % 24 0807       28.5 % 24 1449       30.4 % 04/15/24 1632       31.1 % 24 1145       37.2 % 23 1441    HgB A1c        1h GTT  90 mg/dL 24 1145    3h GTT Fasting       3h GTT 1 hour       3h GTT 2 hour       3h GTT 3 hour        Gonorrhea (discrete)  Negative  23 1447    Chlamydia (discrete)  Negative  23 1447    RPR  Non Reactive  23 1441    Syphilis Antibody  Non Reactive  24 1145    HBsAg  Negative  23 1441    Herpes Simplex Virus PCR       Herpes Simplex VIrus Culture       HIV  Non Reactive  23 1441    Hep C RNA Quant PCR       Hep C Antibody  Non Reactive  23 1441    AFP       NIPT       Cystic Fibroisis        Group B Strep  Negative  24 1703    GBS Susceptibility to Clindamycin       GBS Susceptibility to Erythromycin       Fetal Fibronectin       Genetic Testing, Maternal Blood                 Drug  Screening       Test Value Date Time    Urine Drug Screen       Amphetamine Screen       Barbiturate Screen       Benzodiazepine Screen       Methadone Screen       Phencyclidine Screen       Opiates Screen       THC Screen       Cocaine Screen       Propoxyphene Screen       Buprenorphine Screen       Methamphetamine Screen       Oxycodone Screen       Tricyclic Antidepressants Screen                 Legend    ^: Historical                               OB History    Para Term  AB Living   1 0 0 0 0 0   SAB IAB Ectopic Molar Multiple Live Births   0 0 0 0 0 0      # Outcome Date GA Lbr Nathaniel/2nd Weight Sex Type Anes PTL Lv   1 Current                    Past Medical History:   Diagnosis Date    Anxiety     Depression     Headache     Migraine         History reviewed. No pertinent surgical history.       No current facility-administered medications on file prior to encounter.     Current Outpatient Medications on File Prior to Encounter   Medication Sig Dispense Refill    escitalopram (Lexapro) 10 MG tablet Take 1 tablet by mouth Daily. 30 tablet 2    ferrous sulfate 325 (65 FE) MG tablet Take 1 tablet by mouth Daily With Breakfast. 30 tablet 2    Prenatal Vit-Fe Fumarate-FA (prenatal vitamin 27-0.8) 27-0.8 MG tablet tablet Take  by mouth Daily.            Allergies   Allergen Reactions    Penicillins Rash          Social History     Socioeconomic History    Marital status: Single   Tobacco Use    Smoking status: Never    Smokeless tobacco: Never   Vaping Use    Vaping status: Never Used   Substance and Sexual Activity    Alcohol use: Not Currently     Comment: socially    Drug use: Never    Sexual activity: Yes     Partners: Male     Birth control/protection: None          Family History   Problem Relation Age of Onset    Hypertension Father     Atrial fibrillation Father     Depression Mother     Anxiety disorder Mother     Hypothyroidism Mother     Breast cancer Paternal Grandmother     Ovarian  "cancer Neg Hx     Uterine cancer Neg Hx     Colon cancer Neg Hx     Deep vein thrombosis Neg Hx     Pulmonary embolism Neg Hx         Review of Systems   Constitutional:  Negative for fever.   Eyes:  Negative for visual disturbance.   Gastrointestinal:  Negative for abdominal pain.   Genitourinary:  Negative for dysuria, pelvic pain, vaginal bleeding and vaginal discharge.   Neurological:  Negative for headache.   All other systems reviewed and are negative.      /78   Pulse 104   Temp 98.4 °F (36.9 °C) (Oral)   Resp 18   Ht 170.2 cm (67\")   Wt 97.1 kg (214 lb)   LMP 09/17/2023 (Approximate)   SpO2 98%   Breastfeeding Yes   BMI 33.52 kg/m²     Physical Exam  Vitals reviewed.   Constitutional:       General: She is not in acute distress.     Appearance: She is well-developed and normal weight.   HENT:      Head: Normocephalic and atraumatic.   Eyes:      General:         Right eye: No discharge.         Left eye: No discharge.      Conjunctiva/sclera: Conjunctivae normal.   Neck:      Thyroid: No thyromegaly.   Cardiovascular:      Rate and Rhythm: Normal rate and regular rhythm.      Heart sounds: Normal heart sounds. No murmur heard.  Pulmonary:      Effort: Pulmonary effort is normal. No respiratory distress.      Breath sounds: Normal breath sounds.   Abdominal:      General: There is distension (EFW 7.5#).      Palpations: Abdomen is soft.      Tenderness: There is no abdominal tenderness.   Genitourinary:     Cervix: No cervical bleeding (Cx per RN 1/50/post).      Uterus: Enlarged (Gravid).    Musculoskeletal:         General: No tenderness. Normal range of motion.      Cervical back: Normal range of motion and neck supple.      Right lower leg: Edema present.      Left lower leg: Edema (1+ mary,a) present.   Lymphadenopathy:      Cervical: No cervical adenopathy.   Skin:     General: Skin is warm and dry.      Findings: No rash.   Neurological:      General: No focal deficit present.      Mental " Status: She is alert and oriented to person, place, and time.      Deep Tendon Reflexes: Reflexes normal.   Psychiatric:         Behavior: Behavior normal.         Thought Content: Thought content normal.         Judgment: Judgment normal.           FHT - reassuring, category I   [place] - q 2-4 minutes     Lab Results   Component Value Date    WBC 11.25 (H) 06/09/2024    HGB 9.0 (L) 06/09/2024    HCT 28.2 (L) 06/09/2024    MCV 80.1 06/09/2024     06/09/2024     Lab Results   Component Value Date    GLUCOSE 90 06/09/2024    BUN 6 06/09/2024    CREATININE 0.59 06/09/2024    EGFRRESULT 110.6 05/13/2024    EGFR 129.3 06/09/2024    BCR 10.2 06/09/2024    K 3.7 06/09/2024    CO2 19.7 (L) 06/09/2024    CALCIUM 9.6 06/09/2024    PROTENTOTREF 5.4 (L) 05/13/2024    ALBUMIN 3.5 06/09/2024    BILITOT 0.2 06/09/2024    AST 31 06/09/2024    ALT 21 06/09/2024     Treponemal Ab, NR       Gestational hypertension, third trimester      Assessment:  1.  Intrauterine pregnancy at 38w0d weeks gestation with reassuring fetal status.    2.  induction of labor  for gestational hypertension  with unfavorable cervix  3.  Obstetrical history significant for  anemia and depression  .  4.  GBS status: .negative     Plan:  1. Vaginal anticipated, fetal and uterine monitoring  continuously, cervical ripening with  Misoprostol, labor augmentation  Pitocin, and analgesia with  epidural  Has done misoprostol x 2, feeling her contractions  2. Plan of care has been reviewed with patient and family  3.  Risks, benefits of treatment plan have been discussed.  4.  All questions have been answered.        Malvin Hartley MD  6/9/2024  14:13 EDT

## 2024-06-09 NOTE — PLAN OF CARE
Goal Outcome Evaluation:              Outcome Evaluation: Pt is IOL for GHTN. Pt had 2 doses of cytotec and was started on pitocin. Pt not feeling ctxs, so pitocin stopped and cervidil will be placed. Pt desires epidural, but not at this time. Anticipate vaginal delivery.

## 2024-06-10 PROCEDURE — C1755 CATHETER, INTRASPINAL: HCPCS | Performed by: ANESTHESIOLOGY

## 2024-06-10 PROCEDURE — 25810000003 LACTATED RINGERS PER 1000 ML: Performed by: OBSTETRICS & GYNECOLOGY

## 2024-06-10 PROCEDURE — 25010000002 ROPIVACAINE PER 1 MG: Performed by: ANESTHESIOLOGY

## 2024-06-10 PROCEDURE — 25010000002 FENTANYL CITRATE (PF) 50 MCG/ML SOLUTION: Performed by: STUDENT IN AN ORGANIZED HEALTH CARE EDUCATION/TRAINING PROGRAM

## 2024-06-10 PROCEDURE — 3E033VJ INTRODUCTION OF OTHER HORMONE INTO PERIPHERAL VEIN, PERCUTANEOUS APPROACH: ICD-10-PCS | Performed by: OBSTETRICS & GYNECOLOGY

## 2024-06-10 RX ORDER — CALCIUM CARBONATE 500 MG/1
2 TABLET, CHEWABLE ORAL 3 TIMES DAILY PRN
Status: DISCONTINUED | OUTPATIENT
Start: 2024-06-10 | End: 2024-06-13 | Stop reason: HOSPADM

## 2024-06-10 RX ORDER — ROPIVACAINE HYDROCHLORIDE 2 MG/ML
INJECTION, SOLUTION EPIDURAL; INFILTRATION; PERINEURAL AS NEEDED
Status: DISCONTINUED | OUTPATIENT
Start: 2024-06-10 | End: 2024-06-11 | Stop reason: SURG

## 2024-06-10 RX ORDER — CALCIUM CARBONATE 500 MG/1
2 TABLET, CHEWABLE ORAL 3 TIMES DAILY PRN
Status: CANCELLED | OUTPATIENT
Start: 2024-06-10

## 2024-06-10 RX ORDER — FENTANYL CITRATE 50 UG/ML
INJECTION, SOLUTION INTRAMUSCULAR; INTRAVENOUS AS NEEDED
Status: DISCONTINUED | OUTPATIENT
Start: 2024-06-10 | End: 2024-06-11 | Stop reason: SURG

## 2024-06-10 RX ORDER — OXYTOCIN/0.9 % SODIUM CHLORIDE 30/500 ML
2-20 PLASTIC BAG, INJECTION (ML) INTRAVENOUS
Status: DISCONTINUED | OUTPATIENT
Start: 2024-06-10 | End: 2024-06-13 | Stop reason: HOSPADM

## 2024-06-10 RX ADMIN — SODIUM CHLORIDE, POTASSIUM CHLORIDE, SODIUM LACTATE AND CALCIUM CHLORIDE 125 ML/HR: 600; 310; 30; 20 INJECTION, SOLUTION INTRAVENOUS at 20:42

## 2024-06-10 RX ADMIN — Medication 2 MILLI-UNITS/MIN: at 11:09

## 2024-06-10 RX ADMIN — Medication 10 ML/HR: at 08:05

## 2024-06-10 RX ADMIN — Medication 10 ML/HR: at 23:02

## 2024-06-10 RX ADMIN — SODIUM CHLORIDE, POTASSIUM CHLORIDE, SODIUM LACTATE AND CALCIUM CHLORIDE 999 ML/HR: 600; 310; 30; 20 INJECTION, SOLUTION INTRAVENOUS at 07:58

## 2024-06-10 RX ADMIN — ROPIVACAINE HYDROCHLORIDE 5 ML: 2 INJECTION, SOLUTION EPIDURAL; INFILTRATION at 07:59

## 2024-06-10 RX ADMIN — SODIUM CHLORIDE, POTASSIUM CHLORIDE, SODIUM LACTATE AND CALCIUM CHLORIDE 125 ML/HR: 600; 310; 30; 20 INJECTION, SOLUTION INTRAVENOUS at 13:12

## 2024-06-10 RX ADMIN — ROPIVACAINE HYDROCHLORIDE 6 ML: 2 INJECTION, SOLUTION EPIDURAL; INFILTRATION at 18:56

## 2024-06-10 RX ADMIN — FENTANYL CITRATE 50 MCG: 50 INJECTION, SOLUTION INTRAMUSCULAR; INTRAVENOUS at 18:56

## 2024-06-10 RX ADMIN — ANTACID TABLETS 2 TABLET: 500 TABLET, CHEWABLE ORAL at 13:27

## 2024-06-10 RX ADMIN — ROPIVACAINE HYDROCHLORIDE 4 ML: 2 INJECTION, SOLUTION EPIDURAL; INFILTRATION at 08:01

## 2024-06-10 RX ADMIN — Medication 10 ML/HR: at 16:56

## 2024-06-10 RX ADMIN — ROPIVACAINE HYDROCHLORIDE 4 ML: 2 INJECTION, SOLUTION EPIDURAL; INFILTRATION at 08:03

## 2024-06-10 NOTE — PLAN OF CARE
Goal Outcome Evaluation:  Plan of Care Reviewed With: patient        Progress: improving  Outcome Evaluation: Pt is IOL for GHTN. Pt is currently on 14 of pitocin. Pt resting comfortably with epidural. Anticipate vaginal delivery.

## 2024-06-10 NOTE — ANESTHESIA PROCEDURE NOTES
Labor Epidural      Patient location during procedure: OB  Start Time: 6/10/2024 7:54 AM  Stop Time: 6/10/2024 8:05 AM  Indication:at surgeon's request  Performed By  Anesthesiologist: Jerry Gannon MD  Preanesthetic Checklist  Completed: patient identified, IV checked, site marked, risks and benefits discussed, surgical consent, monitors and equipment checked, pre-op evaluation and timeout performed  Prep:  Pt Position:sitting  Sterile Tech:cap, gloves and mask  Prep:povidone-iodine 7.5% surgical scrub  Monitoring:blood pressure monitoring, continuous pulse oximetry and EKG  Epidural Block Procedure:  Approach:midline  Guidance:landmark technique  Location:L4-L5  Needle Type:Tuohy  Needle Gauge:17 and 17 G  Loss of Resistance Medium: air  Loss of Resistance: 7cm  Cath Depth at skin:10 cm  Paresthesia: none  Aspiration:negative  Test Dose:negative  Number of Attempts: 1  Post Assessment:  Dressing:occlusive dressing applied and secured with tape  Pt Tolerance:patient tolerated the procedure well with no apparent complications  Complications:no

## 2024-06-10 NOTE — ANESTHESIA PREPROCEDURE EVALUATION
Anesthesia Evaluation     Patient summary reviewed and Nursing notes reviewed                Airway   Mallampati: II  TM distance: >3 FB  Neck ROM: full  no difficulty expected  Dental - normal exam     Pulmonary - normal exam   Cardiovascular - normal exam        Neuro/Psych  (+) headaches, psychiatric history Anxiety and Depression  GI/Hepatic/Renal/Endo      Musculoskeletal     Abdominal  - normal exam   Substance History      OB/GYN    (+) Pregnant        Other                    Anesthesia Plan    ASA 2     epidural     (  38w1d  )    Anesthetic plan, risks, benefits, and alternatives have been provided, discussed and informed consent has been obtained with: patient.    CODE STATUS:    Level Of Support Discussed With: Patient  Code Status (Patient has no pulse and is not breathing): CPR (Attempt to Resuscitate)  Medical Interventions (Patient has pulse or is breathing): Full Support

## 2024-06-11 PROCEDURE — 0503F POSTPARTUM CARE VISIT: CPT | Performed by: NURSE PRACTITIONER

## 2024-06-11 PROCEDURE — 59400 OBSTETRICAL CARE: CPT | Performed by: OBSTETRICS & GYNECOLOGY

## 2024-06-11 PROCEDURE — 88307 TISSUE EXAM BY PATHOLOGIST: CPT

## 2024-06-11 PROCEDURE — 0HQ9XZZ REPAIR PERINEUM SKIN, EXTERNAL APPROACH: ICD-10-PCS | Performed by: OBSTETRICS & GYNECOLOGY

## 2024-06-11 RX ORDER — DOCUSATE SODIUM 100 MG/1
100 CAPSULE, LIQUID FILLED ORAL 2 TIMES DAILY
Status: DISCONTINUED | OUTPATIENT
Start: 2024-06-11 | End: 2024-06-13 | Stop reason: HOSPADM

## 2024-06-11 RX ORDER — OXYTOCIN/0.9 % SODIUM CHLORIDE 30/500 ML
999 PLASTIC BAG, INJECTION (ML) INTRAVENOUS ONCE
Status: COMPLETED | OUTPATIENT
Start: 2024-06-11 | End: 2024-06-11

## 2024-06-11 RX ORDER — SODIUM CHLORIDE 0.9 % (FLUSH) 0.9 %
1-10 SYRINGE (ML) INJECTION AS NEEDED
Status: DISCONTINUED | OUTPATIENT
Start: 2024-06-11 | End: 2024-06-13 | Stop reason: HOSPADM

## 2024-06-11 RX ORDER — OXYTOCIN/0.9 % SODIUM CHLORIDE 30/500 ML
125 PLASTIC BAG, INJECTION (ML) INTRAVENOUS ONCE AS NEEDED
Status: DISCONTINUED | OUTPATIENT
Start: 2024-06-11 | End: 2024-06-13 | Stop reason: HOSPADM

## 2024-06-11 RX ORDER — HYDROCODONE BITARTRATE AND ACETAMINOPHEN 5; 325 MG/1; MG/1
1 TABLET ORAL EVERY 4 HOURS PRN
Status: DISCONTINUED | OUTPATIENT
Start: 2024-06-11 | End: 2024-06-13 | Stop reason: HOSPADM

## 2024-06-11 RX ORDER — MISOPROSTOL 200 UG/1
800 TABLET ORAL ONCE AS NEEDED
Status: DISCONTINUED | OUTPATIENT
Start: 2024-06-11 | End: 2024-06-11 | Stop reason: HOSPADM

## 2024-06-11 RX ORDER — DIPHENHYDRAMINE HCL 25 MG
25 CAPSULE ORAL NIGHTLY PRN
Status: DISCONTINUED | OUTPATIENT
Start: 2024-06-11 | End: 2024-06-13 | Stop reason: HOSPADM

## 2024-06-11 RX ORDER — HYDROCODONE BITARTRATE AND ACETAMINOPHEN 10; 325 MG/1; MG/1
1 TABLET ORAL EVERY 4 HOURS PRN
Status: DISCONTINUED | OUTPATIENT
Start: 2024-06-11 | End: 2024-06-13 | Stop reason: HOSPADM

## 2024-06-11 RX ORDER — ONDANSETRON 2 MG/ML
4 INJECTION INTRAMUSCULAR; INTRAVENOUS EVERY 6 HOURS PRN
Status: DISCONTINUED | OUTPATIENT
Start: 2024-06-11 | End: 2024-06-13 | Stop reason: HOSPADM

## 2024-06-11 RX ORDER — CARBOPROST TROMETHAMINE 250 UG/ML
250 INJECTION, SOLUTION INTRAMUSCULAR
Status: DISCONTINUED | OUTPATIENT
Start: 2024-06-11 | End: 2024-06-11 | Stop reason: HOSPADM

## 2024-06-11 RX ORDER — BISACODYL 10 MG
10 SUPPOSITORY, RECTAL RECTAL DAILY PRN
Status: DISCONTINUED | OUTPATIENT
Start: 2024-06-12 | End: 2024-06-13 | Stop reason: HOSPADM

## 2024-06-11 RX ORDER — HYDROCORTISONE 25 MG/G
1 CREAM TOPICAL AS NEEDED
Status: DISCONTINUED | OUTPATIENT
Start: 2024-06-11 | End: 2024-06-13 | Stop reason: HOSPADM

## 2024-06-11 RX ORDER — PRENATAL VIT/IRON FUM/FOLIC AC 27MG-0.8MG
1 TABLET ORAL DAILY
Status: DISCONTINUED | OUTPATIENT
Start: 2024-06-11 | End: 2024-06-13 | Stop reason: HOSPADM

## 2024-06-11 RX ORDER — ONDANSETRON 4 MG/1
4 TABLET, ORALLY DISINTEGRATING ORAL EVERY 6 HOURS PRN
Status: DISCONTINUED | OUTPATIENT
Start: 2024-06-11 | End: 2024-06-13 | Stop reason: HOSPADM

## 2024-06-11 RX ORDER — PHYTONADIONE 1 MG/.5ML
INJECTION, EMULSION INTRAMUSCULAR; INTRAVENOUS; SUBCUTANEOUS
Status: ACTIVE
Start: 2024-06-11 | End: 2024-06-11

## 2024-06-11 RX ORDER — IBUPROFEN 600 MG/1
600 TABLET ORAL EVERY 6 HOURS PRN
Status: DISCONTINUED | OUTPATIENT
Start: 2024-06-11 | End: 2024-06-13 | Stop reason: HOSPADM

## 2024-06-11 RX ORDER — ESCITALOPRAM OXALATE 10 MG/1
10 TABLET ORAL DAILY
Status: DISCONTINUED | OUTPATIENT
Start: 2024-06-11 | End: 2024-06-13 | Stop reason: HOSPADM

## 2024-06-11 RX ORDER — ERYTHROMYCIN 5 MG/G
OINTMENT OPHTHALMIC
Status: ACTIVE
Start: 2024-06-11 | End: 2024-06-11

## 2024-06-11 RX ORDER — OXYTOCIN/0.9 % SODIUM CHLORIDE 30/500 ML
250 PLASTIC BAG, INJECTION (ML) INTRAVENOUS CONTINUOUS
Status: ACTIVE | OUTPATIENT
Start: 2024-06-11 | End: 2024-06-11

## 2024-06-11 RX ORDER — TRANEXAMIC ACID 10 MG/ML
1000 INJECTION, SOLUTION INTRAVENOUS ONCE AS NEEDED
Status: DISCONTINUED | OUTPATIENT
Start: 2024-06-11 | End: 2024-06-13 | Stop reason: HOSPADM

## 2024-06-11 RX ORDER — METHYLERGONOVINE MALEATE 0.2 MG/ML
200 INJECTION INTRAVENOUS ONCE AS NEEDED
Status: DISCONTINUED | OUTPATIENT
Start: 2024-06-11 | End: 2024-06-13 | Stop reason: HOSPADM

## 2024-06-11 RX ORDER — ACETAMINOPHEN 325 MG/1
650 TABLET ORAL EVERY 6 HOURS PRN
Status: DISCONTINUED | OUTPATIENT
Start: 2024-06-11 | End: 2024-06-13 | Stop reason: HOSPADM

## 2024-06-11 RX ADMIN — Medication 999 ML/HR: at 01:25

## 2024-06-11 RX ADMIN — DOCUSATE SODIUM 100 MG: 100 CAPSULE, LIQUID FILLED ORAL at 09:00

## 2024-06-11 RX ADMIN — Medication: at 05:51

## 2024-06-11 RX ADMIN — ACETAMINOPHEN 325MG 650 MG: 325 TABLET ORAL at 12:31

## 2024-06-11 RX ADMIN — DOCUSATE SODIUM 100 MG: 100 CAPSULE, LIQUID FILLED ORAL at 07:45

## 2024-06-11 RX ADMIN — IBUPROFEN 600 MG: 600 TABLET, FILM COATED ORAL at 02:04

## 2024-06-11 RX ADMIN — DOCUSATE SODIUM 100 MG: 100 CAPSULE, LIQUID FILLED ORAL at 19:57

## 2024-06-11 RX ADMIN — IBUPROFEN 600 MG: 600 TABLET, FILM COATED ORAL at 13:51

## 2024-06-11 RX ADMIN — IBUPROFEN 600 MG: 600 TABLET, FILM COATED ORAL at 07:45

## 2024-06-11 RX ADMIN — Medication 1 TABLET: at 09:54

## 2024-06-11 RX ADMIN — IBUPROFEN 600 MG: 600 TABLET, FILM COATED ORAL at 22:36

## 2024-06-11 RX ADMIN — ACETAMINOPHEN 325MG 650 MG: 325 TABLET ORAL at 02:03

## 2024-06-11 RX ADMIN — ACETAMINOPHEN 325MG 650 MG: 325 TABLET ORAL at 18:50

## 2024-06-11 NOTE — PROGRESS NOTES
Postpartum Progress Note      Status post Vaginal Delivery: Doing well postoperatively.     1) Postpartum care immediately following delivery :    Routine care, continue current care. Anticipate discharge home in the next 1-2 days.    2) GHTN: No current medications. BP normal range at this time. She is asymptomatic. Will continue to monitor    3) Depression: Mood stable on lexapro    Rh status: A-, infant Rh negative  Syphilis screen in hospital: non-reactive  Rubella: Immune  Gender: male, desires circumcision     Subjective     Postpartum Day 0: Vaginal delivery    The patient feels tired. The patient denies emotional concerns. Pain is well controlled with current medications. The baby is well. The patient is ambulating well. The patient is tolerating a normal diet.     Objective     Vital signs in last 24 hours:  Temp:  [97.9 °F (36.6 °C)-100.2 °F (37.9 °C)] 98.3 °F (36.8 °C)  Heart Rate:  [] 99  Resp:  [16-18] 16  BP: ()/(36-80) 115/57      General:    alert, appears stated age, and cooperative   CV: RRR, no m/r/g   Lungs: CTAB   Abdomen:  Soft, Non-tender    Lochia:  appropriate   Uterine Fundus:   firm   Ext    Edema trace   DVT Evaluation:  No evidence of DVT seen on physical exam.     Lab Results   Component Value Date    WBC 11.25 (H) 06/09/2024    HGB 9.0 (L) 06/09/2024    HCT 28.2 (L) 06/09/2024    MCV 80.1 06/09/2024     06/09/2024       Mela Gonzalez, MICHAEL  6/11/2024  09:08 EDT

## 2024-06-11 NOTE — PLAN OF CARE
Problem: Adult Inpatient Plan of Care  Goal: Plan of Care Review  Outcome: Ongoing, Progressing  Flowsheets (Taken 6/11/2024 0803)  Progress: improving  Plan of Care Reviewed With:   patient   spouse  Outcome Evaluation: VSS, assessments wnl, voiding and passing gas, ambulating well, pain well controlled, working on breastfeeding.  Goal: Patient-Specific Goal (Individualized)  Outcome: Ongoing, Progressing  Goal: Absence of Hospital-Acquired Illness or Injury  Outcome: Ongoing, Progressing  Intervention: Identify and Manage Fall Risk  Recent Flowsheet Documentation  Taken 6/11/2024 0731 by Kezia Sage RN  Safety Promotion/Fall Prevention: safety round/check completed  Intervention: Prevent Skin Injury  Recent Flowsheet Documentation  Taken 6/11/2024 0731 by Kezia Sage RN  Body Position: position changed independently  Intervention: Prevent and Manage VTE (Venous Thromboembolism) Risk  Recent Flowsheet Documentation  Taken 6/11/2024 0731 by Kezia Sage RN  Activity Management: ambulated to bathroom  Goal: Optimal Comfort and Wellbeing  Outcome: Ongoing, Progressing  Intervention: Monitor Pain and Promote Comfort  Recent Flowsheet Documentation  Taken 6/11/2024 0745 by Kezia Sage RN  Pain Management Interventions:   care clustered   see MAR   quiet environment facilitated  Intervention: Provide Person-Centered Care  Recent Flowsheet Documentation  Taken 6/11/2024 0731 by Kezia Sage RN  Trust Relationship/Rapport:   care explained   choices provided  Goal: Readiness for Transition of Care  Outcome: Ongoing, Progressing     Problem: Bleeding (Labor)  Goal: Hemostasis  Outcome: Ongoing, Progressing     Problem: Change in Fetal Wellbeing (Labor)  Goal: Stable Fetal Wellbeing  Outcome: Ongoing, Progressing  Intervention: Promote and Monitor Fetal Wellbeing  Recent Flowsheet Documentation  Taken 6/11/2024 0731 by Kezia Sage RN  Body Position: position changed independently     Problem: Delayed Labor  Progression (Labor)  Goal: Effective Progression to Delivery  Outcome: Ongoing, Progressing     Problem: Infection (Labor)  Goal: Absence of Infection Signs and Symptoms  Outcome: Ongoing, Progressing     Problem: Labor Pain (Labor)  Goal: Acceptable Pain Control  Outcome: Ongoing, Progressing     Problem: Uterine Tachysystole (Labor)  Goal: Normal Uterine Contraction Pattern  Outcome: Ongoing, Progressing     Problem: Device-Related Complication Risk (Anesthesia/Analgesia, Neuraxial)  Goal: Safe Infusion Delivery Completion  Outcome: Ongoing, Progressing     Problem: Infection (Anesthesia/Analgesia, Neuraxial)  Goal: Absence of Infection Signs and Symptoms  Outcome: Ongoing, Progressing     Problem: Nausea and Vomiting (Anesthesia/Analgesia, Neuraxial)  Goal: Nausea and Vomiting Relief  Outcome: Ongoing, Progressing     Problem: Pain (Anesthesia/Analgesia, Neuraxial)  Goal: Effective Pain Control  Outcome: Ongoing, Progressing  Intervention: Prevent or Manage Pain  Recent Flowsheet Documentation  Taken 2024 0745 by Kezia Sage RN  Pain Management Interventions:   care clustered   see MAR   quiet environment facilitated     Problem: Respiratory Compromise (Anesthesia/Analgesia, Neuraxial)  Goal: Effective Oxygenation and Ventilation  Outcome: Ongoing, Progressing  Intervention: Optimize Oxygenation and Ventilation  Recent Flowsheet Documentation  Taken 2024 0731 by Kezia Sage RN  Head of Bed (HOB) Positioning: HOB elevated     Problem: Sensorimotor Impairment (Anesthesia/Analgesia, Neuraxial)  Goal: Baseline Motor Function  Outcome: Ongoing, Progressing  Intervention: Optimize Sensorimotor Function  Recent Flowsheet Documentation  Taken 2024 0731 by Kezia Sage RN  Safety Promotion/Fall Prevention: safety round/check completed     Problem: Urinary Retention (Anesthesia/Analgesia, Neuraxial)  Goal: Effective Urinary Elimination  Outcome: Ongoing, Progressing     Problem:  Fall Injury  Risk  Goal: Absence of Fall, Infant Drop and Related Injury  Outcome: Ongoing, Progressing  Intervention: Promote Injury-Free Environment  Recent Flowsheet Documentation  Taken 6/11/2024 0731 by Kezia Sage RN  Safety Promotion/Fall Prevention: safety round/check completed     Problem: Hypertensive Disorders in Pregnancy  Goal: Maternal-Fetal Stabilization  Outcome: Ongoing, Progressing     Problem: Skin Injury Risk Increased  Goal: Skin Health and Integrity  Outcome: Ongoing, Progressing  Intervention: Optimize Skin Protection  Recent Flowsheet Documentation  Taken 6/11/2024 0731 by Kezia Sage RN  Head of Bed (HOB) Positioning: HOB elevated   Goal Outcome Evaluation:  Plan of Care Reviewed With: patient, spouse        Progress: improving  Outcome Evaluation: VSS, assessments wnl, voiding and passing gas, ambulating well, pain well controlled, working on breastfeeding.

## 2024-06-11 NOTE — LACTATION NOTE
This note was copied from a baby's chart.  P1,T, mom has bf twice since delivery and describes strong tugging james normal nipple shape after release. She may use a friends PBP and order and hands free pump through insurance.     BF education reviewed:  Attempt feeding every 3 hours and when baby is alert, feeding cues present.   Normal  behavior including sleepiness- HE  or pump and give ebm if no latch achieved.  Proper way to position and steps for an effective latch,check nipple shape after feeds.   Weight and output expectations.  Infant stomach size  Full milk supply day 3-5.  Nipple care. Lanolin given with instructions.  Review Post[facility] handbook pages 35-45, [facility].  Call LC for assistance. # on WB.

## 2024-06-11 NOTE — L&D DELIVERY NOTE
" [facility]   Vaginal Delivery Note    Patient Name: Marisol Moses  : 2000  MRN: [id number]    Date of Delivery: 2024     Diagnosis     Pre & Post-Delivery:  Intrauterine pregnancy at 38w2d  Labor status:      Gestational hypertension, third trimester             Problem List    Transfer to Postpartum     Review the Delivery Report for details.     Delivery     Delivery:      YOB: 2024    Time of Birth:  Gestational Age 1:22 AM   38w2d     Anesthesia: Epidural     Delivering clinician: Ramesh Alonso    Forceps?   No   Vacuum? No    Shoulder dystocia present: No        Delivery narrative: Called to the room with patient complete and pushing very well.  Category 1 fetal heart tracing was noted.  She was being induced for gestational hypertension.  She was prepped and draped in usual manner and she crowns and delivered then over an intact perineum left occiput anterior.  There was a nuchal cord that was reduced as the shoulders were delivered with continued downward traction and maternal expulsive effort.  Baby was placed on maternal abdomen for 30 seconds prior to cord clamping.  There was a small right vaginal first-degree laceration repaired with 3-0 Vicryl Rapide.  Placenta delivers promptly after delivery the baby with gentle traction and noted to be with marginal cord insertion and otherwise normal-appearing with three-vessel cord.  Good uterine tone and good hemostasis resulted.      Infant     Findings: male  infant     Infant observations: Weight: No birth weight on file.   Length:   in  Observations/Comments:  faheem rm 5      Apgars: 8  @ 1 minute /    9  @ 5 minutes   Infant Name: .     Placenta & Cord         Placenta delivered  Expressed  at   2024  1:25 AM     Cord:   present.   Nuchal Cord?  yes; Number of nuchal loops present:  2    Cord blood obtained: Yes    Cord gases obtained:  No    Cord gas results: Venous:  No results found for: \"PHCVEN\", " "\"BECVEN\"    Arterial:  No results found for: \"PHCART\", \"BECART\"     Repair     Episiotomy: None     No    Lacerations: Yes  Laceration Information  Laceration Repaired?   Perineal: 1st  Yes    Periurethral:       Labial:       Sulcus:       Vaginal:       Cervical:         Suture used for repair: 3-0 Vicryl Rapide  Laceration Length for 3rd or 4th degree lacerations: . cm   Estimated Blood Loss:  150 cc     Quantitative Blood Loss:          Complications     hypertension    Disposition     Mother to Mother Baby/Postpartum  in stable condition currently.  Baby to remains with mom  in stable condition currently.    Ramesh Alonso MD  06/11/24  01:41 EDT        "

## 2024-06-11 NOTE — ANESTHESIA POSTPROCEDURE EVALUATION
"Patient: Marisol Moses    Procedure Summary       Date: 06/10/24 Room / Location:     Anesthesia Start: 0754 Anesthesia Stop: 06/11/24 0122    Procedure: LABOR ANALGESIA Diagnosis:     Scheduled Providers:  Provider: Jerry Gannon MD    Anesthesia Type: epidural ASA Status: 2            Anesthesia Type: epidural    Vitals  Vitals Value Taken Time   /70 06/11/24 0545   Temp 37 °C (98.6 °F) 06/11/24 0545   Pulse 95 06/11/24 0545   Resp 16 06/11/24 0545   SpO2 100 % 06/11/24 0234   Vitals shown include unfiled device data.        Post Anesthesia Care and Evaluation      Comments: /70 (BP Location: Right arm, Patient Position: Sitting)   Pulse 95   Temp 37 °C (98.6 °F) (Oral)   Resp 16   Ht 170.2 cm (67\")   Wt 97.1 kg (214 lb)   LMP 09/17/2023 (Approximate)   SpO2 100%   Breastfeeding Yes   BMI 33.52 kg/m²     MD was available throughout the duration of the labor epidural.      "

## 2024-06-11 NOTE — LACTATION NOTE
Lactation Consult Note  P1 term baby, 15 hrs old. Mom reports baby has been sleepy today. Baby has short frenulum. He has had one wet and one stool today. Assisted with deep latch and baby nursed well x 15 minutes with nutritive suckle.   Discussed : ways to keep baby awake while nursing, how to know baby is getting enough milk, feeding cues, expected output, nursing on demand or every 3hrs, pump 15 minutes bilaterally and feed baby all EBM if he is too sleepy and not breast feeding well.     Evaluation Completed: 2024 16:48 EDT  Patient Name: Marisol Moses  :  2000  MRN:  [id number]     REFERRAL  INFORMATION:                          Date of Referral: 24   Person Making Referral: nurse  Maternal Reason for Referral: breastfeeding currently  Infant Reason for Referral: sleepy    DELIVERY HISTORY:        Skin to skin initiation date/time: 2024  1:29 AM   Skin to skin end date/time:           MATERNAL ASSESSMENT:  Breast Size Issue: none (24)  Breast Shape: pendulous, round (24)  Breast Density: soft (24)  Areola: elastic (24)  Nipples: everted (24)                INFANT ASSESSMENT:  Information for the patient's :  Cari Moses [[id number]]   No past medical history on file.   Feeding Readiness Cues: energy for feeding (24)      Feeding Tolerance/Success: arousal required, coordinated suck/swallow/breathing (24)                              Breastfeeding: breastfeeding, right side only (24)   Infant Positioning: cross-cradle (24)         Effective Latch During Feeding: yes (24)   Suck/Swallow/Breathing Coordination: present (24)   Signs of Milk Transfer: deep jaw excursions noted (24)       Latch: 2-->grasps breast, tongue down, lips flanged, rhythmic sucking (24)   Audible Swallowin-->none (24)   Type of Nipple:  2-->everted (after stimulation) (06/11/24 1610)   Comfort (Breast/Nipple): 2-->soft/nontender (06/11/24 1610)   Hold (Positioning): 1-->minimal assist, teach one side, mother does other, staff holds (06/11/24 1610)   Latch Score: 7 (06/11/24 1610)     Infant-Driven Feeding Scales - Readiness: Alert once handled. Some rooting or takes pacifier. Adequate tone. (06/11/24 1610)   Infant-Driven Feeding Scales - Quality: Nipples with a strong coordinated SSB but fatigues with progression. (06/11/24 1610)            MATERNAL INFANT FEEDING:     Maternal Emotional State: relaxed (06/11/24 1610)  Infant Positioning: cross-cradle (06/11/24 1610)   Signs of Milk Transfer: deep jaw excursions noted (06/11/24 1610)  Pain with Feeding: no (06/11/24 1610)           Milk Ejection Reflex: present (06/11/24 1610)           Latch Assistance: minimal assistance (06/11/24 1610)                               EQUIPMENT TYPE:  Breast Pump Type: manual pump (06/11/24 1610)                              BREAST PUMPING:          LACTATION REFERRALS:

## 2024-06-12 LAB
BASOPHILS # BLD AUTO: 0.04 10*3/MM3 (ref 0–0.2)
BASOPHILS NFR BLD AUTO: 0.3 % (ref 0–1.5)
DEPRECATED RDW RBC AUTO: 43.1 FL (ref 37–54)
EOSINOPHIL # BLD AUTO: 0.53 10*3/MM3 (ref 0–0.4)
EOSINOPHIL NFR BLD AUTO: 4.1 % (ref 0.3–6.2)
ERYTHROCYTE [DISTWIDTH] IN BLOOD BY AUTOMATED COUNT: 15 % (ref 12.3–15.4)
HCT VFR BLD AUTO: 24 % (ref 34–46.6)
HGB BLD-MCNC: 7.7 G/DL (ref 12–15.9)
IMM GRANULOCYTES # BLD AUTO: 0.35 10*3/MM3 (ref 0–0.05)
IMM GRANULOCYTES NFR BLD AUTO: 2.7 % (ref 0–0.5)
LYMPHOCYTES # BLD AUTO: 2.62 10*3/MM3 (ref 0.7–3.1)
LYMPHOCYTES NFR BLD AUTO: 20 % (ref 19.6–45.3)
MCH RBC QN AUTO: 25.8 PG (ref 26.6–33)
MCHC RBC AUTO-ENTMCNC: 32.1 G/DL (ref 31.5–35.7)
MCV RBC AUTO: 80.5 FL (ref 79–97)
MONOCYTES # BLD AUTO: 0.95 10*3/MM3 (ref 0.1–0.9)
MONOCYTES NFR BLD AUTO: 7.3 % (ref 5–12)
NEUTROPHILS NFR BLD AUTO: 65.6 % (ref 42.7–76)
NEUTROPHILS NFR BLD AUTO: 8.58 10*3/MM3 (ref 1.7–7)
NRBC BLD AUTO-RTO: 0.2 /100 WBC (ref 0–0.2)
PLATELET # BLD AUTO: 351 10*3/MM3 (ref 140–450)
PMV BLD AUTO: 9.8 FL (ref 6–12)
RBC # BLD AUTO: 2.98 10*6/MM3 (ref 3.77–5.28)
WBC NRBC COR # BLD AUTO: 13.07 10*3/MM3 (ref 3.4–10.8)

## 2024-06-12 PROCEDURE — 85025 COMPLETE CBC W/AUTO DIFF WBC: CPT | Performed by: OBSTETRICS & GYNECOLOGY

## 2024-06-12 PROCEDURE — 90791 PSYCH DIAGNOSTIC EVALUATION: CPT

## 2024-06-12 PROCEDURE — 0503F POSTPARTUM CARE VISIT: CPT

## 2024-06-12 RX ORDER — FERROUS SULFATE 325(65) MG
325 TABLET ORAL
Status: DISCONTINUED | OUTPATIENT
Start: 2024-06-12 | End: 2024-06-13 | Stop reason: HOSPADM

## 2024-06-12 RX ADMIN — IBUPROFEN 600 MG: 600 TABLET, FILM COATED ORAL at 19:57

## 2024-06-12 RX ADMIN — ACETAMINOPHEN 325MG 650 MG: 325 TABLET ORAL at 09:09

## 2024-06-12 RX ADMIN — DOCUSATE SODIUM 100 MG: 100 CAPSULE, LIQUID FILLED ORAL at 19:57

## 2024-06-12 RX ADMIN — FERROUS SULFATE TAB 325 MG (65 MG ELEMENTAL FE) 325 MG: 325 (65 FE) TAB at 09:09

## 2024-06-12 RX ADMIN — Medication 1 TABLET: at 09:09

## 2024-06-12 RX ADMIN — DOCUSATE SODIUM 100 MG: 100 CAPSULE, LIQUID FILLED ORAL at 09:09

## 2024-06-12 RX ADMIN — IBUPROFEN 600 MG: 600 TABLET, FILM COATED ORAL at 04:35

## 2024-06-12 NOTE — PLAN OF CARE
Problem: Adult Inpatient Plan of Care  Goal: Plan of Care Review  Outcome: Ongoing, Progressing  Flowsheets (Taken 6/12/2024 0600)  Progress: improving  Plan of Care Reviewed With:   patient   spouse  Outcome Evaluation: vss, assessments wnl, bleeding to a minimum, voiding and passing gas, pain well controlled, ambulaing well, working on breastfeeding  Goal: Patient-Specific Goal (Individualized)  Outcome: Ongoing, Progressing  Goal: Absence of Hospital-Acquired Illness or Injury  Outcome: Ongoing, Progressing  Intervention: Identify and Manage Fall Risk  Recent Flowsheet Documentation  Taken 6/12/2024 0435 by Elizabeth Thomas RN  Safety Promotion/Fall Prevention: safety round/check completed  Taken 6/12/2024 0426 by Elizabeth Thomas RN  Safety Promotion/Fall Prevention: safety round/check completed  Taken 6/12/2024 0234 by Elizabeth Thomas RN  Safety Promotion/Fall Prevention: safety round/check completed  Taken 6/12/2024 0006 by Elizabeth Thomas RN  Safety Promotion/Fall Prevention: safety round/check completed  Taken 6/11/2024 2236 by Elizabeth Thomas RN  Safety Promotion/Fall Prevention: safety round/check completed  Taken 6/11/2024 2027 by Elizabeth Thomas RN  Safety Promotion/Fall Prevention: safety round/check completed  Taken 6/11/2024 1957 by Elizabeth Thomas RN  Safety Promotion/Fall Prevention: safety round/check completed  Intervention: Prevent Skin Injury  Recent Flowsheet Documentation  Taken 6/11/2024 1957 by Elizabeth Thomas RN  Body Position: position changed independently  Intervention: Prevent and Manage VTE (Venous Thromboembolism) Risk  Recent Flowsheet Documentation  Taken 6/11/2024 1957 by Elizabeth Thomas RN  Activity Management: up ad sadia  Goal: Optimal Comfort and Wellbeing  Outcome: Ongoing, Progressing  Intervention: Monitor Pain and Promote Comfort  Recent Flowsheet Documentation  Taken 6/12/2024 0435 by Elizabeth Thomas RN  Pain Management Interventions: see MAR  Intervention: Provide  Person-Centered Care  Recent Flowsheet Documentation  Taken 6/11/2024 1957 by Elizabeth Thomas, RN  Trust Relationship/Rapport:   care explained   choices provided  Goal: Readiness for Transition of Care  Outcome: Ongoing, Progressing     Problem: Bleeding (Labor)  Goal: Hemostasis  Outcome: Ongoing, Progressing     Problem: Change in Fetal Wellbeing (Labor)  Goal: Stable Fetal Wellbeing  Outcome: Ongoing, Progressing  Intervention: Promote and Monitor Fetal Wellbeing  Recent Flowsheet Documentation  Taken 6/11/2024 1957 by Elizabeth Thomas RN  Body Position: position changed independently     Problem: Delayed Labor Progression (Labor)  Goal: Effective Progression to Delivery  Outcome: Ongoing, Progressing     Problem: Infection (Labor)  Goal: Absence of Infection Signs and Symptoms  Outcome: Ongoing, Progressing     Problem: Labor Pain (Labor)  Goal: Acceptable Pain Control  Outcome: Ongoing, Progressing     Problem: Uterine Tachysystole (Labor)  Goal: Normal Uterine Contraction Pattern  Outcome: Ongoing, Progressing     Problem: Device-Related Complication Risk (Anesthesia/Analgesia, Neuraxial)  Goal: Safe Infusion Delivery Completion  Outcome: Ongoing, Progressing     Problem: Infection (Anesthesia/Analgesia, Neuraxial)  Goal: Absence of Infection Signs and Symptoms  Outcome: Ongoing, Progressing     Problem: Nausea and Vomiting (Anesthesia/Analgesia, Neuraxial)  Goal: Nausea and Vomiting Relief  Outcome: Ongoing, Progressing     Problem: Pain (Anesthesia/Analgesia, Neuraxial)  Goal: Effective Pain Control  Outcome: Ongoing, Progressing  Intervention: Prevent or Manage Pain  Recent Flowsheet Documentation  Taken 6/12/2024 0435 by Elizabeth Thomas, RN  Pain Management Interventions: see MAR     Problem: Respiratory Compromise (Anesthesia/Analgesia, Neuraxial)  Goal: Effective Oxygenation and Ventilation  Outcome: Ongoing, Progressing  Intervention: Optimize Oxygenation and Ventilation  Recent Flowsheet  Documentation  Taken 2024 by Elizabeth Thomas RN  Head of Bed (HOB) Positioning: HOB elevated     Problem: Sensorimotor Impairment (Anesthesia/Analgesia, Neuraxial)  Goal: Baseline Motor Function  Outcome: Ongoing, Progressing  Intervention: Optimize Sensorimotor Function  Recent Flowsheet Documentation  Taken 2024 0435 by Elizabeth Thomas RN  Safety Promotion/Fall Prevention: safety round/check completed  Taken 2024 0426 by Elizabeth Thomas RN  Safety Promotion/Fall Prevention: safety round/check completed  Taken 2024 0234 by Elizabeth Thomas RN  Safety Promotion/Fall Prevention: safety round/check completed  Taken 2024 0006 by Elizabeth Thomas RN  Safety Promotion/Fall Prevention: safety round/check completed  Taken 2024 by Elizabeth Thomas RN  Safety Promotion/Fall Prevention: safety round/check completed  Taken 2024 by Elizabeth Thomas RN  Safety Promotion/Fall Prevention: safety round/check completed  Taken 2024 by Elizabeth Thomas RN  Safety Promotion/Fall Prevention: safety round/check completed     Problem: Urinary Retention (Anesthesia/Analgesia, Neuraxial)  Goal: Effective Urinary Elimination  Outcome: Ongoing, Progressing     Problem:  Fall Injury Risk  Goal: Absence of Fall, Infant Drop and Related Injury  Outcome: Ongoing, Progressing  Intervention: Promote Injury-Free Environment  Recent Flowsheet Documentation  Taken 2024 0435 by Elizabeth Thomas RN  Safety Promotion/Fall Prevention: safety round/check completed  Taken 2024 0426 by Elizabeth Thomas RN  Safety Promotion/Fall Prevention: safety round/check completed  Taken 2024 0234 by Elizabeth Thomas RN  Safety Promotion/Fall Prevention: safety round/check completed  Taken 2024 0006 by Elizabeth Thomas RN  Safety Promotion/Fall Prevention: safety round/check completed  Taken 2024 by Elizabeth Thomas RN  Safety Promotion/Fall Prevention: safety round/check  completed  Taken 6/11/2024 2027 by Elizabeth Thomas, RN  Safety Promotion/Fall Prevention: safety round/check completed  Taken 6/11/2024 1957 by Elizabeth Thomas RN  Safety Promotion/Fall Prevention: safety round/check completed     Problem: Hypertensive Disorders in Pregnancy  Goal: Maternal-Fetal Stabilization  Outcome: Ongoing, Progressing     Problem: Skin Injury Risk Increased  Goal: Skin Health and Integrity  Outcome: Ongoing, Progressing  Intervention: Optimize Skin Protection  Recent Flowsheet Documentation  Taken 6/11/2024 1957 by Elizabeth Thomas, RN  Head of Bed (HOB) Positioning: HOB elevated   Goal Outcome Evaluation:  Plan of Care Reviewed With: patient, spouse        Progress: improving  Outcome Evaluation: vss, assessments wnl, bleeding to a minimum, voiding and passing gas, pain well controlled, ambulaing well, working on breastfeeding

## 2024-06-12 NOTE — CONSULTS
"REASON FOR ACCESS CONSULT:    EPDS score 19    HPI:  Pt had vaginal delivery of baby boy, Gaston, yesterday. She scored 19 on  depression scale so Access consulted.    Pt was found RIB holding baby. Britton Livingston, joined interview toward end and pt gave permission for his presence for the remainder.     Discussed pt's scores and she relayed, \"Oh, I have a history of anxiety and depression since I was a teen\". She did voice she is having a lot of anxiety - \"it's through the roof\" - due to \"I'm just worrying about him... this whole experience\". She rated her current anxiety level 10/10 (10 being the worst). She denied current feelings of depression and stated, \"I'm on cloud 9\". She denied SI/HI/AVH now or in recent past. She denied any type of intrusive thoughts other than having some rumination regards her anxiety about pregnancy and being a new mother and everything working out okay. She denied issues with sleep or appetite. Current life stressors: \"not really\".     MENTAL HEALTH HX:  As above, hx of anxiety and depression. She has had trials of a few medications. Before pregnancy, pt had been taking Lexapro that was managed by her PCP. She stopped it when she became pregnant. She felt that medication had been working for her.  She has never been under the care of a psychiatrist, but has worked with counseling in the past. She denied hx of psych hospitalizations, suicide attempts/self-harm. There is maternal hx of depression/anxiety. Denied hx of abuse/trauma.    SUBSTANCE USE HX:  Pt drank ETOH \"recreationally\" before pregnancy. She denied current/past hx of drugs. No hx of EOTH/drug treatment. She does not smoke.    SOCIAL HX:  Pt has been with fiZipano for 2.5 yrs. They have no other children, this is their first child. They live together in a house and she reported this is a safe environment. 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 She denied current legalities. She stated " she has a strong support system with fiance, parents, and friends.    PLAN:  Education regards PPD given. She stated she is aware she is at risk given her depression history. She is undecided about resuming antidepressant medication due to breastfeeding and this is something she will discuss with her OB provider. She declined outpatient mental referrals. Her fiance voiced no concerns.

## 2024-06-12 NOTE — LACTATION NOTE
Patient requesting NS d/t nipple soreness.  Provided with 24 mm NS with instructions for application and cleaning given.  D/w this is to be for short term use.  Provided with soothie gel pads and instructions given on nipple cream usage.  Nipples are intact, no skin breakdown noted.  Encouraged to call if needing any further assistance.

## 2024-06-12 NOTE — PROGRESS NOTES
VAGINAL DELIVERY POSTPARTUM DAY 1    6/12/2024  PATIENT: Marisol Moses        MR#:[id number]  LOCATION: [facility]iatric  DATE OF ADMISSION: 6/9/2024  ADMISSION  DIAGNOSIS:   Gestational hypertension, third trimester     CURRENT DIAGNOSIS: No notes have been filed under this hospital service.  Service: Hospitalist      SUBJECTIVE     Marisol feels well.  Patient describes her lochia as less than menses.  Pain is well controlled.        OBJECTIVE   Temp: Temp:  [97.7 °F (36.5 °C)-98.4 °F (36.9 °C)] 97.9 °F (36.6 °C) Temp src: Oral   BP: BP: (108-126)/(55-75) 108/61        Pulse: Heart Rate:  [] 101  RR: Resp:  [16-20] 16    General:  Awake, alert, no acute distress   Cardiac: Regular rate and rhythm    Respiratory: Lungs clear bilaterally, normal respiratory effort    Abdomen: Soft, non-distended, fundus firm, below umbilicus, appropriately tender   Pelvis: deferred   Extremities: Calves NT bilaterally, DTR 2+, no clonus noted, trace edema     Lab Results   Component Value Date    WBC 13.07 (H) 06/12/2024    HGB 7.7 (L) 06/12/2024    HCT 24.0 (L) 06/12/2024     06/12/2024    AST 31 06/09/2024    ALT 21 06/09/2024    CREATININE 0.59 06/09/2024       ASSESSMENT:  Postpartum day 1 after vaginal delivery. Hgb: 7.7 - patient declines blood products at this time.    PLAN:Doing well. Add and PO ferrous sulfate to daily medications for hgb 7.7 - recheck CBC in AM. Continue routine supportive care. Discontinue IV, advance diet, may shower. Plan for discharge tomorrow as clinical picture allows.     Glenys Negron CNM  10:06 EDT  June 12, 2024

## 2024-06-12 NOTE — LACTATION NOTE
This note was copied from a baby's chart.  Mom reports baby is latching using a NS on left breast because she was feeling discomfort. She reports baby nursed for an hour last night but was still crying after the feeding and was given pacifier. Discussed how this is feeding cue and to either breast feed baby again or pump and feed baby EBM. Baby has had 2 wets so far today. Encouraged to call for any assistance or questions.

## 2024-06-13 VITALS
DIASTOLIC BLOOD PRESSURE: 71 MMHG | HEART RATE: 107 BPM | OXYGEN SATURATION: 100 % | TEMPERATURE: 98.1 F | WEIGHT: 214 LBS | BODY MASS INDEX: 33.59 KG/M2 | RESPIRATION RATE: 16 BRPM | HEIGHT: 67 IN | SYSTOLIC BLOOD PRESSURE: 120 MMHG

## 2024-06-13 PROBLEM — O13.3 GESTATIONAL HYPERTENSION, THIRD TRIMESTER: Status: RESOLVED | Noted: 2024-06-09 | Resolved: 2024-06-13

## 2024-06-13 LAB
BASOPHILS # BLD AUTO: 0.03 10*3/MM3 (ref 0–0.2)
BASOPHILS NFR BLD AUTO: 0.3 % (ref 0–1.5)
DEPRECATED RDW RBC AUTO: 45 FL (ref 37–54)
EOSINOPHIL # BLD AUTO: 0.47 10*3/MM3 (ref 0–0.4)
EOSINOPHIL NFR BLD AUTO: 4.1 % (ref 0.3–6.2)
ERYTHROCYTE [DISTWIDTH] IN BLOOD BY AUTOMATED COUNT: 15.6 % (ref 12.3–15.4)
HCT VFR BLD AUTO: 24.9 % (ref 34–46.6)
HGB BLD-MCNC: 7.9 G/DL (ref 12–15.9)
IMM GRANULOCYTES # BLD AUTO: 0.29 10*3/MM3 (ref 0–0.05)
IMM GRANULOCYTES NFR BLD AUTO: 2.5 % (ref 0–0.5)
LYMPHOCYTES # BLD AUTO: 1.83 10*3/MM3 (ref 0.7–3.1)
LYMPHOCYTES NFR BLD AUTO: 15.8 % (ref 19.6–45.3)
MCH RBC QN AUTO: 26 PG (ref 26.6–33)
MCHC RBC AUTO-ENTMCNC: 31.7 G/DL (ref 31.5–35.7)
MCV RBC AUTO: 81.9 FL (ref 79–97)
MONOCYTES # BLD AUTO: 0.74 10*3/MM3 (ref 0.1–0.9)
MONOCYTES NFR BLD AUTO: 6.4 % (ref 5–12)
NEUTROPHILS NFR BLD AUTO: 70.9 % (ref 42.7–76)
NEUTROPHILS NFR BLD AUTO: 8.19 10*3/MM3 (ref 1.7–7)
NRBC BLD AUTO-RTO: 0.3 /100 WBC (ref 0–0.2)
PLATELET # BLD AUTO: 342 10*3/MM3 (ref 140–450)
PMV BLD AUTO: 9.7 FL (ref 6–12)
RBC # BLD AUTO: 3.04 10*6/MM3 (ref 3.77–5.28)
WBC NRBC COR # BLD AUTO: 11.55 10*3/MM3 (ref 3.4–10.8)

## 2024-06-13 PROCEDURE — 85025 COMPLETE CBC W/AUTO DIFF WBC: CPT

## 2024-06-13 PROCEDURE — 0503F POSTPARTUM CARE VISIT: CPT

## 2024-06-13 RX ORDER — FERROUS SULFATE 325(65) MG
325 TABLET ORAL
Qty: 30 TABLET | Refills: 3 | Status: SHIPPED | OUTPATIENT
Start: 2024-06-13

## 2024-06-13 RX ORDER — IBUPROFEN 600 MG/1
600 TABLET ORAL EVERY 6 HOURS PRN
Qty: 60 TABLET | Refills: 0 | Status: SHIPPED | OUTPATIENT
Start: 2024-06-13

## 2024-06-13 RX ORDER — PSEUDOEPHEDRINE HCL 30 MG
100 TABLET ORAL 2 TIMES DAILY
Qty: 60 CAPSULE | Refills: 1 | Status: SHIPPED | OUTPATIENT
Start: 2024-06-13

## 2024-06-13 RX ADMIN — IBUPROFEN 600 MG: 600 TABLET, FILM COATED ORAL at 09:52

## 2024-06-13 RX ADMIN — DOCUSATE SODIUM 100 MG: 100 CAPSULE, LIQUID FILLED ORAL at 08:56

## 2024-06-13 RX ADMIN — FERROUS SULFATE TAB 325 MG (65 MG ELEMENTAL FE) 325 MG: 325 (65 FE) TAB at 08:56

## 2024-06-13 RX ADMIN — Medication 1 TABLET: at 08:56

## 2024-06-13 NOTE — PLAN OF CARE
Goal Outcome Evaluation:  Plan of Care Reviewed With: patient, spouse        Plan is to DC today. Hgb 7.9

## 2024-06-13 NOTE — PROGRESS NOTES
VAGINAL DELIVERY POSTPARTUM DAY 2    6/13/2024  PATIENT: Marisol Moses        MR#:[id number]  LOCATION: [facility]  DATE OF ADMISSION: 6/9/2024  ADMISSION  DIAGNOSIS:   Gestational hypertension, third trimester    Postpartum anemia     CURRENT DIAGNOSIS: No notes have been filed under this hospital service.  Service: Hospitalist      SUBJECTIVE     Marisol feels well.  Patient describes her lochia as less than menses.  Pain is well controlled.       OBJECTIVE   Temp: Temp:  [97.8 °F (36.6 °C)-98.3 °F (36.8 °C)] 98.1 °F (36.7 °C) Temp src: Oral   BP: BP: (116-125)/(71-79) 120/71        Pulse: Heart Rate:  [] 107  RR: Resp:  [16] 16    General:  Awake, alert, no acute distress   Cardiac: Regular rate and rhythm    Respiratory: Lungs clear bilaterally, normal respiratory effort    Abdomen: Soft, non-distended, fundus firm, below umbilicus, appropriately tender   Pelvis: deferred   Extremities: Calves NT bilaterally, DTR 2+, no clonus noted, trace edema     Lab Results   Component Value Date    WBC 13.07 (H) 06/12/2024    HGB 7.7 (L) 06/12/2024    HCT 24.0 (L) 06/12/2024     06/12/2024    AST 31 06/09/2024    ALT 21 06/09/2024    CREATININE 0.59 06/09/2024       ASSESSMENT: Postpartum day 2 after vaginal delivery. Hgb: 7.7.    PLAN: Doing well. Discharge to home. Discharge instructions given, precautions reviewed. Follow up with [facility] [place] [place] [facility] in 1 week for blood pressure check  in 4 to 6 weeks for routine postpartum visit. Prescription for ibuprofen 600mg PO every 6 hours PRN for pain, docusate 100mg PO BID, and ferrous sulfate 325mg daily. Advised no tampons, menstrual cups, intercourse, or tub baths for 6 weeks.     Glenys Negron CNM  10:36 EDT  June 13, 2024

## 2024-06-13 NOTE — DISCHARGE SUMMARY
VAGINAL DELIVERY DISCHARGE SUMMARY      PATIENT: Marisol Moses        MR#:[id number]  LOCATION: [facility]  ADMISSION  DIAGNOSIS:   Postpartum anemia     (spontaneous vaginal delivery)    Gestational hypertension without significant proteinuria, postpartum     DISCHARGE DIAGNOSIS:   1. Gestational hypertension, third trimester          DATE OF ADMISSION: 2024  DATE OF DISCHARGE: 24     PROCEDURES:  Vaginal, Spontaneous     2024    1:22 AM      SERVICE: Obstetrics    HOSPITAL COURSE: Marisol underwent vaginal delivery of a male infant and remained in the hospital for 4 days. During that time, Marisol remained afebrile and hemodynamically stable. On the day of discharge, Marisol was eating, ambulating and voiding without difficulty.      VARICELLA: immune.   RUBELLA: immune.    LABS:   Lab Results   Component Value Date    WBC 11.55 (H) 2024    HGB 7.9 (L) 2024    HCT 24.9 (L) 2024    MCV 81.9 2024     2024    CREATININE 0.59 2024    AST 31 2024    ALT 21 2024     Results from last 7 days   Lab Units 24  0807   ABO TYPING  A   RH TYPING  Negative   ANTIBODY SCREEN  Positive       DISCHARGE MEDICATIONS     Discharge Medications        New Medications        Instructions Start Date   docusate sodium 100 MG capsule   100 mg, Oral, 2 Times Daily      ibuprofen 600 MG tablet  Commonly known as: ADVIL,MOTRIN   600 mg, Oral, Every 6 Hours PRN             Continue These Medications        Instructions Start Date   escitalopram 10 MG tablet  Commonly known as: Lexapro   10 mg, Oral, Daily      ferrous sulfate 325 (65 FE) MG tablet   325 mg, Oral, Daily With Breakfast      prenatal vitamin 27-0.8 27-0.8 MG tablet tablet   Oral, Daily               DISCHARGE DISPOSITION: Home    DISCHARGE CONDITION: Stable    DISCHARGE DIET: Regular    ACTIVITY AT DISCHARGE: Pelvic rest    INFANT FEEDING PLANS: Breast    EDUCATION: Warning signs and symptoms  given, no tub baths, nothing in the vagina for 6 weeks.     FOLLOW-UP APPOINTMENTS: Follow up with [facility] [place] [facility] in 1 week for blood pressure check  in 4 to 6 weeks for routine postpartum visit.     Glenys Negron CNM  06/13/24  10:41 EDT

## 2024-06-13 NOTE — LACTATION NOTE
This note was copied from a baby's chart.  PT is going home today. Reports baby is BF well with the NS. Informed her about the [facility] info on the back of the edicational booklet. Discussed engourgement, pumping, milk storage and when to expect mature milk. PT denieis any questions.

## 2024-06-17 ENCOUNTER — TELEPHONE (OUTPATIENT)
Dept: OBSTETRICS AND GYNECOLOGY | Facility: CLINIC | Age: 24
End: 2024-06-17
Payer: COMMERCIAL

## 2024-06-17 RX ORDER — ESCITALOPRAM OXALATE 10 MG/1
10 TABLET ORAL DAILY
Qty: 90 TABLET | Refills: 3 | Status: SHIPPED | OUTPATIENT
Start: 2024-06-17 | End: 2025-06-17

## 2024-06-19 ENCOUNTER — CLINICAL SUPPORT (OUTPATIENT)
Dept: OBSTETRICS AND GYNECOLOGY | Facility: CLINIC | Age: 24
End: 2024-06-19
Payer: COMMERCIAL

## 2024-06-19 VITALS
SYSTOLIC BLOOD PRESSURE: 117 MMHG | DIASTOLIC BLOOD PRESSURE: 81 MMHG | HEART RATE: 91 BPM | WEIGHT: 184 LBS | BODY MASS INDEX: 28.82 KG/M2

## 2024-06-20 ENCOUNTER — TELEPHONE (OUTPATIENT)
Dept: OBSTETRICS AND GYNECOLOGY | Facility: CLINIC | Age: 24
End: 2024-06-20
Payer: COMMERCIAL

## 2024-06-20 NOTE — TELEPHONE ENCOUNTER
Dr Hartley said your BP's are great!  It is okay to use Lexapro when breast feeding and of that class of drug it is supposed to be the one best suited for both anxiety and depression.  He recommends to see how it goes. If not working well in 4-6 weeks, we could increase dose or consider alternatives. My Chart message sent.

## 2024-06-20 NOTE — TELEPHONE ENCOUNTER
Ronit,     BP great!  Okay to use Lexapro when breast feeding and of that class of drug it is supposed to be the one best suited for both anxiety and depression.      So I would see how it goes. If not working well in 4-6 weeks, we could increase dose or consider alternatives.     Please let her know    Thanks,   Dr. Hartley    ----- Message from Des HARRIS sent at 6/19/2024  3:37 PM EDT -----  Pt here for BP check. BP is 117/81. Pt also wanted to make sure the medication that was sent over for her (Lexapro) is safe for breastfeeding and can take care of anxiety and depression. She states her anxiety is higher than her depression at this time. Please advise

## 2024-06-25 ENCOUNTER — MATERNAL SCREENING (OUTPATIENT)
Dept: CALL CENTER | Facility: HOSPITAL | Age: 24
End: 2024-06-25
Payer: COMMERCIAL

## 2024-06-25 NOTE — OUTREACH NOTE
Maternal Screening Survey      Flowsheet Row Responses   Facility patient discharged from? [facility]   Attempt successful? Yes   Call start time 1004   Revoke Decline to participate  [states she will pass at this time and does not wish to participate]              Iwona HARO - Registered Nurse

## 2024-06-27 RX ORDER — FERROUS SULFATE 325(65) MG
1 TABLET ORAL
Qty: 90 TABLET | OUTPATIENT
Start: 2024-06-27

## 2024-07-22 ENCOUNTER — POSTPARTUM VISIT (OUTPATIENT)
Dept: OBSTETRICS AND GYNECOLOGY | Facility: CLINIC | Age: 24
End: 2024-07-22
Payer: COMMERCIAL

## 2024-07-22 VITALS
DIASTOLIC BLOOD PRESSURE: 78 MMHG | WEIGHT: 172 LBS | HEIGHT: 67 IN | SYSTOLIC BLOOD PRESSURE: 107 MMHG | BODY MASS INDEX: 27 KG/M2 | HEART RATE: 109 BPM

## 2024-07-22 PROCEDURE — 0503F POSTPARTUM CARE VISIT: CPT | Performed by: OBSTETRICS & GYNECOLOGY

## 2024-07-22 NOTE — SIGNIFICANT NOTE
24 1553   [place]  Depression Scale   EPD Scale: Able to Laugh 0-->as much as she always could   EPD Scale: Looked Forward 0-->as much as she ever did   EPD Scale: Blamed Self 0-->no, never   EPD Scale: Been Anxious 3-->yes, very often   EPD Scale: Felt Panicky 3-->yes, quite a lot   EPD Scale: Things Getting on Top 0-->no, has been coping as well as ever   EPD Scale: Difficulty Sleeping 0-->no, not at all   EPD Scale: Sad or Miserable 0-->no, not at all   EPD Scale: Crying 1-->only occasionally   EPD Scale: Thought of Harming Self 0-->never   [place]  Depression Score 7

## 2024-07-22 NOTE — PROGRESS NOTES
"Here for Postpartum Check     HPI    24 y.o.  - Delivered 2024, now at 6 weeks postpartum for follow up.   Complications of pregnancy/delivery/postpartum : GHTN  Breastfeeding/Bottlefeeding : Breast/Bottle  Baby Blues: Anxiety-using Lexapro, Carson score-7  Contraception : declines  Last pap : 2023 NIL   Complaints: denies     Review of Systems   Constitutional:  Negative for chills and fever.   Gastrointestinal:  Negative for abdominal pain.   Genitourinary:  Negative for dysuria, menstrual problem, pelvic pain, vaginal bleeding and vaginal discharge.   All other systems reviewed and are negative.      /78   Pulse 109   Ht 170.2 cm (67\")   Wt 78 kg (172 lb)   Breastfeeding Yes   BMI 26.94 kg/m²     Physical Exam  Constitutional:       General: She is not in acute distress.     Appearance: She is well-developed and normal weight.   Genitourinary:      Vulva normal.      Right Labia: No lesions or Bartholin's cyst.     Left Labia: No lesions or Bartholin's cyst.     No inguinal adenopathy present in the right or left side.     No vaginal discharge or bleeding.        Right Adnexa: not tender, not full and no mass present.     Left Adnexa: not tender, not full and no mass present.     No cervical motion tenderness or friability.      Uterus is not enlarged or tender.      No uterine mass detected.     Uterus is anteverted.   Breasts:     Right: No inverted nipple, mass or nipple discharge.      Left: No inverted nipple, mass or nipple discharge.   HENT:      Head: Normocephalic and atraumatic.      Nose: Nose normal.   Eyes:      Conjunctiva/sclera: Conjunctivae normal.      Pupils: Pupils are equal, round, and reactive to light.   Neck:      Thyroid: No thyromegaly.   Cardiovascular:      Rate and Rhythm: Normal rate and regular rhythm.      Heart sounds: Normal heart sounds. No murmur heard.  Pulmonary:      Effort: Pulmonary effort is normal. No respiratory distress.      Breath " sounds: Normal breath sounds.   Abdominal:      General: Abdomen is flat. There is no distension.      Palpations: Abdomen is soft.      Tenderness: There is no abdominal tenderness.   Musculoskeletal:         General: No deformity. Normal range of motion.      Cervical back: Normal range of motion and neck supple.      Right lower leg: No edema.      Left lower leg: No edema.   Lymphadenopathy:      Lower Body: No right inguinal adenopathy. No left inguinal adenopathy.   Neurological:      Mental Status: She is alert and oriented to person, place, and time.   Skin:     General: Skin is warm and dry.      Findings: No erythema.   Psychiatric:         Behavior: Behavior normal.         Thought Content: Thought content normal.         Judgment: Judgment normal.   Vitals reviewed. Exam conducted with a chaperone present.            Assessment/plan   Diagnoses and all orders for this visit:    1. Postpartum state (Primary)      1) Postpartum   Released from restrictions   Recommendation : 150 min/week of moderate intensity aerobic activity     Malvin Hartley MD   7/22/2024  16:14 EDT